# Patient Record
Sex: FEMALE | Race: WHITE | NOT HISPANIC OR LATINO | Employment: FULL TIME | URBAN - METROPOLITAN AREA
[De-identification: names, ages, dates, MRNs, and addresses within clinical notes are randomized per-mention and may not be internally consistent; named-entity substitution may affect disease eponyms.]

---

## 2017-01-16 ENCOUNTER — APPOINTMENT (EMERGENCY)
Dept: RADIOLOGY | Facility: HOSPITAL | Age: 52
End: 2017-01-16
Payer: COMMERCIAL

## 2017-01-16 ENCOUNTER — HOSPITAL ENCOUNTER (OUTPATIENT)
Facility: HOSPITAL | Age: 52
Setting detail: OBSERVATION
Discharge: HOME | End: 2017-01-17
Attending: EMERGENCY MEDICINE | Admitting: FAMILY MEDICINE
Payer: COMMERCIAL

## 2017-01-16 DIAGNOSIS — R07.9 CHEST PAIN, UNSPECIFIED TYPE: Primary | ICD-10-CM

## 2017-01-16 PROBLEM — K76.0 NONALCOHOLIC FATTY LIVER DISEASE: Status: ACTIVE | Noted: 2017-01-16

## 2017-01-16 PROBLEM — R73.03 PREDIABETES: Status: ACTIVE | Noted: 2017-01-16

## 2017-01-16 PROBLEM — K62.5 BRBPR (BRIGHT RED BLOOD PER RECTUM): Status: ACTIVE | Noted: 2017-01-16

## 2017-01-16 PROBLEM — J44.9 COPD (CHRONIC OBSTRUCTIVE PULMONARY DISEASE) (HCC): Status: ACTIVE | Noted: 2017-01-16

## 2017-01-16 LAB
ALBUMIN SERPL BCP-MCNC: 3.6 G/DL (ref 3.5–5)
ALP SERPL-CCNC: 88 U/L (ref 46–116)
ALT SERPL W P-5'-P-CCNC: 30 U/L (ref 12–78)
ANION GAP SERPL CALCULATED.3IONS-SCNC: 10 MMOL/L (ref 4–13)
APTT PPP: 29 SECONDS (ref 24–33)
AST SERPL W P-5'-P-CCNC: 14 U/L (ref 5–45)
BASOPHILS # BLD AUTO: 0.1 THOUSANDS/ΜL (ref 0–0.1)
BASOPHILS NFR BLD AUTO: 1 % (ref 0–1)
BILIRUB SERPL-MCNC: 0.2 MG/DL (ref 0.2–1)
BUN SERPL-MCNC: 15 MG/DL (ref 5–25)
CALCIUM SERPL-MCNC: 8.9 MG/DL (ref 8.3–10.1)
CHLORIDE SERPL-SCNC: 106 MMOL/L (ref 100–108)
CO2 SERPL-SCNC: 27 MMOL/L (ref 21–32)
CREAT SERPL-MCNC: 0.86 MG/DL (ref 0.6–1.3)
EOSINOPHIL # BLD AUTO: 0.1 THOUSAND/ΜL (ref 0–0.61)
EOSINOPHIL NFR BLD AUTO: 2 % (ref 0–6)
ERYTHROCYTE [DISTWIDTH] IN BLOOD BY AUTOMATED COUNT: 13.9 % (ref 11.6–15.1)
GFR SERPL CREATININE-BSD FRML MDRD: >60 ML/MIN/1.73SQ M
GLUCOSE SERPL-MCNC: 99 MG/DL (ref 65–140)
HCT VFR BLD AUTO: 39.2 % (ref 37–47)
HGB BLD-MCNC: 13 G/DL (ref 12–16)
INR PPP: 0.95 (ref 0.86–1.16)
LYMPHOCYTES # BLD AUTO: 2.9 THOUSANDS/ΜL (ref 0.6–4.47)
LYMPHOCYTES NFR BLD AUTO: 37 % (ref 14–44)
MCH RBC QN AUTO: 30.2 PG (ref 27–31)
MCHC RBC AUTO-ENTMCNC: 33.2 G/DL (ref 31.4–37.4)
MCV RBC AUTO: 91 FL (ref 82–98)
MONOCYTES # BLD AUTO: 0.5 THOUSAND/ΜL (ref 0.17–1.22)
MONOCYTES NFR BLD AUTO: 6 % (ref 4–12)
NEUTROPHILS # BLD AUTO: 4.3 THOUSANDS/ΜL (ref 1.85–7.62)
NEUTS SEG NFR BLD AUTO: 54 % (ref 43–75)
NRBC BLD AUTO-RTO: 0 /100 WBCS
PLATELET # BLD AUTO: 305 THOUSANDS/UL (ref 130–400)
PMV BLD AUTO: 7.9 FL (ref 8.9–12.7)
POTASSIUM SERPL-SCNC: 3.7 MMOL/L (ref 3.5–5.3)
PROT SERPL-MCNC: 7.3 G/DL (ref 6.4–8.2)
PROTHROMBIN TIME: 10 SECONDS (ref 9.4–11.7)
RBC # BLD AUTO: 4.31 MILLION/UL (ref 4.2–5.4)
SODIUM SERPL-SCNC: 143 MMOL/L (ref 136–145)
TROPONIN I SERPL-MCNC: <0.02 NG/ML
WBC # BLD AUTO: 7.9 THOUSAND/UL (ref 4.8–10.8)

## 2017-01-16 PROCEDURE — 85025 COMPLETE CBC W/AUTO DIFF WBC: CPT | Performed by: EMERGENCY MEDICINE

## 2017-01-16 PROCEDURE — 96374 THER/PROPH/DIAG INJ IV PUSH: CPT

## 2017-01-16 PROCEDURE — 36415 COLL VENOUS BLD VENIPUNCTURE: CPT | Performed by: EMERGENCY MEDICINE

## 2017-01-16 PROCEDURE — 84484 ASSAY OF TROPONIN QUANT: CPT | Performed by: EMERGENCY MEDICINE

## 2017-01-16 PROCEDURE — 80061 LIPID PANEL: CPT | Performed by: FAMILY MEDICINE

## 2017-01-16 PROCEDURE — 99285 EMERGENCY DEPT VISIT HI MDM: CPT

## 2017-01-16 PROCEDURE — 80053 COMPREHEN METABOLIC PANEL: CPT | Performed by: EMERGENCY MEDICINE

## 2017-01-16 PROCEDURE — 85610 PROTHROMBIN TIME: CPT | Performed by: EMERGENCY MEDICINE

## 2017-01-16 PROCEDURE — 93005 ELECTROCARDIOGRAM TRACING: CPT | Performed by: EMERGENCY MEDICINE

## 2017-01-16 PROCEDURE — 71010 HB CHEST X-RAY 1 VIEW FRONTAL (PORTABLE): CPT

## 2017-01-16 PROCEDURE — 85730 THROMBOPLASTIN TIME PARTIAL: CPT | Performed by: EMERGENCY MEDICINE

## 2017-01-16 RX ORDER — NITROGLYCERIN 0.4 MG/1
0.4 TABLET SUBLINGUAL ONCE
Status: COMPLETED | OUTPATIENT
Start: 2017-01-16 | End: 2017-01-16

## 2017-01-16 RX ORDER — MORPHINE SULFATE 4 MG/ML
4 INJECTION, SOLUTION INTRAMUSCULAR; INTRAVENOUS ONCE
Status: COMPLETED | OUTPATIENT
Start: 2017-01-16 | End: 2017-01-16

## 2017-01-16 RX ORDER — ASPIRIN 81 MG/1
81 TABLET, CHEWABLE ORAL ONCE
Status: COMPLETED | OUTPATIENT
Start: 2017-01-16 | End: 2017-01-16

## 2017-01-16 RX ADMIN — MORPHINE SULFATE 4 MG: 4 INJECTION, SOLUTION INTRAMUSCULAR; INTRAVENOUS at 20:02

## 2017-01-16 RX ADMIN — ASPIRIN 81 MG 81 MG: 81 TABLET ORAL at 19:29

## 2017-01-16 RX ADMIN — NITROGLYCERIN 0.4 MG: 0.4 TABLET SUBLINGUAL at 19:31

## 2017-01-17 ENCOUNTER — APPOINTMENT (OUTPATIENT)
Dept: RADIOLOGY | Facility: HOSPITAL | Age: 52
End: 2017-01-17
Payer: COMMERCIAL

## 2017-01-17 ENCOUNTER — APPOINTMENT (OUTPATIENT)
Dept: NON INVASIVE DIAGNOSTICS | Facility: HOSPITAL | Age: 52
End: 2017-01-17
Payer: COMMERCIAL

## 2017-01-17 VITALS
SYSTOLIC BLOOD PRESSURE: 122 MMHG | HEART RATE: 82 BPM | BODY MASS INDEX: 31.15 KG/M2 | OXYGEN SATURATION: 98 % | DIASTOLIC BLOOD PRESSURE: 60 MMHG | HEIGHT: 61 IN | WEIGHT: 165 LBS | RESPIRATION RATE: 18 BRPM | TEMPERATURE: 97.9 F

## 2017-01-17 LAB
ALBUMIN SERPL BCP-MCNC: 3.1 G/DL (ref 3.5–5)
ALP SERPL-CCNC: 75 U/L (ref 46–116)
ALT SERPL W P-5'-P-CCNC: 25 U/L (ref 12–78)
ANION GAP SERPL CALCULATED.3IONS-SCNC: 10 MMOL/L (ref 4–13)
AST SERPL W P-5'-P-CCNC: 15 U/L (ref 5–45)
BILIRUB SERPL-MCNC: 0.2 MG/DL (ref 0.2–1)
BUN SERPL-MCNC: 12 MG/DL (ref 5–25)
CALCIUM SERPL-MCNC: 8.6 MG/DL (ref 8.3–10.1)
CHLORIDE SERPL-SCNC: 108 MMOL/L (ref 100–108)
CHOLEST SERPL-MCNC: 166 MG/DL (ref 50–200)
CO2 SERPL-SCNC: 26 MMOL/L (ref 21–32)
CREAT SERPL-MCNC: 0.63 MG/DL (ref 0.6–1.3)
ERYTHROCYTE [DISTWIDTH] IN BLOOD BY AUTOMATED COUNT: 14 % (ref 11.6–15.1)
EST. AVERAGE GLUCOSE BLD GHB EST-MCNC: 117 MG/DL
FLUAV AG SPEC QL IA: NEGATIVE
FLUAV AG SPEC QL: NORMAL
FLUBV AG SPEC QL IA: NEGATIVE
FLUBV AG SPEC QL: NORMAL
GFR SERPL CREATININE-BSD FRML MDRD: >60 ML/MIN/1.73SQ M
GLUCOSE SERPL-MCNC: 103 MG/DL (ref 65–140)
HBA1C MFR BLD: 5.7 % (ref 4.2–6.3)
HCT VFR BLD AUTO: 37 % (ref 37–47)
HDLC SERPL-MCNC: 64 MG/DL (ref 40–60)
HGB BLD-MCNC: 12.1 G/DL (ref 12–16)
LDLC SERPL CALC-MCNC: 89 MG/DL (ref 0–100)
MAGNESIUM SERPL-MCNC: 2 MG/DL (ref 1.6–2.6)
MCH RBC QN AUTO: 29.9 PG (ref 27–31)
MCHC RBC AUTO-ENTMCNC: 32.7 G/DL (ref 31.4–37.4)
MCV RBC AUTO: 92 FL (ref 82–98)
PHOSPHATE SERPL-MCNC: 3.9 MG/DL (ref 2.7–4.5)
PLATELET # BLD AUTO: 262 THOUSANDS/UL (ref 130–400)
PLATELET # BLD AUTO: 294 THOUSANDS/UL (ref 130–400)
PMV BLD AUTO: 7.5 FL (ref 8.9–12.7)
PMV BLD AUTO: 8 FL (ref 8.9–12.7)
POTASSIUM SERPL-SCNC: 3.4 MMOL/L (ref 3.5–5.3)
PROT SERPL-MCNC: 6.5 G/DL (ref 6.4–8.2)
RBC # BLD AUTO: 4.04 MILLION/UL (ref 4.2–5.4)
RSV B RNA SPEC QL NAA+PROBE: NORMAL
SODIUM SERPL-SCNC: 144 MMOL/L (ref 136–145)
TRIGL SERPL-MCNC: 67 MG/DL
TROPONIN I SERPL-MCNC: <0.02 NG/ML
TROPONIN I SERPL-MCNC: <0.02 NG/ML
WBC # BLD AUTO: 7.8 THOUSAND/UL (ref 4.8–10.8)

## 2017-01-17 PROCEDURE — 84100 ASSAY OF PHOSPHORUS: CPT | Performed by: FAMILY MEDICINE

## 2017-01-17 PROCEDURE — 84484 ASSAY OF TROPONIN QUANT: CPT | Performed by: FAMILY MEDICINE

## 2017-01-17 PROCEDURE — 87400 INFLUENZA A/B EACH AG IA: CPT | Performed by: INTERNAL MEDICINE

## 2017-01-17 PROCEDURE — A9502 TC99M TETROFOSMIN: HCPCS

## 2017-01-17 PROCEDURE — 80053 COMPREHEN METABOLIC PANEL: CPT | Performed by: FAMILY MEDICINE

## 2017-01-17 PROCEDURE — 87798 DETECT AGENT NOS DNA AMP: CPT | Performed by: INTERNAL MEDICINE

## 2017-01-17 PROCEDURE — 93005 ELECTROCARDIOGRAM TRACING: CPT | Performed by: FAMILY MEDICINE

## 2017-01-17 PROCEDURE — 85027 COMPLETE CBC AUTOMATED: CPT | Performed by: FAMILY MEDICINE

## 2017-01-17 PROCEDURE — 93017 CV STRESS TEST TRACING ONLY: CPT

## 2017-01-17 PROCEDURE — 85049 AUTOMATED PLATELET COUNT: CPT | Performed by: FAMILY MEDICINE

## 2017-01-17 PROCEDURE — 87081 CULTURE SCREEN ONLY: CPT | Performed by: FAMILY MEDICINE

## 2017-01-17 PROCEDURE — 83735 ASSAY OF MAGNESIUM: CPT | Performed by: FAMILY MEDICINE

## 2017-01-17 PROCEDURE — 78452 HT MUSCLE IMAGE SPECT MULT: CPT

## 2017-01-17 PROCEDURE — 94760 N-INVAS EAR/PLS OXIMETRY 1: CPT

## 2017-01-17 PROCEDURE — 83036 HEMOGLOBIN GLYCOSYLATED A1C: CPT | Performed by: FAMILY MEDICINE

## 2017-01-17 RX ORDER — METOPROLOL SUCCINATE 25 MG/1
25 TABLET, EXTENDED RELEASE ORAL DAILY
Qty: 30 TABLET | Refills: 0
Start: 2017-01-17 | End: 2018-03-09 | Stop reason: SDUPTHER

## 2017-01-17 RX ORDER — NITROGLYCERIN 0.4 MG/1
0.4 TABLET SUBLINGUAL
Status: DISCONTINUED | OUTPATIENT
Start: 2017-01-17 | End: 2017-01-17 | Stop reason: HOSPADM

## 2017-01-17 RX ORDER — ATORVASTATIN CALCIUM 40 MG/1
40 TABLET, FILM COATED ORAL DAILY
Qty: 30 TABLET | Refills: 0
Start: 2017-01-17 | End: 2018-03-09 | Stop reason: SDUPTHER

## 2017-01-17 RX ORDER — ACETAMINOPHEN 325 MG/1
650 TABLET ORAL EVERY 6 HOURS PRN
Status: DISCONTINUED | OUTPATIENT
Start: 2017-01-17 | End: 2017-01-17 | Stop reason: HOSPADM

## 2017-01-17 RX ORDER — MORPHINE SULFATE 2 MG/ML
1 INJECTION, SOLUTION INTRAMUSCULAR; INTRAVENOUS EVERY 4 HOURS PRN
Status: DISCONTINUED | OUTPATIENT
Start: 2017-01-17 | End: 2017-01-17 | Stop reason: HOSPADM

## 2017-01-17 RX ORDER — SODIUM CHLORIDE 9 MG/ML
50 INJECTION, SOLUTION INTRAVENOUS CONTINUOUS
Status: DISCONTINUED | OUTPATIENT
Start: 2017-01-17 | End: 2017-01-17 | Stop reason: HOSPADM

## 2017-01-17 RX ORDER — ALBUTEROL SULFATE 2.5 MG/3ML
2.5 SOLUTION RESPIRATORY (INHALATION) EVERY 6 HOURS PRN
Status: DISCONTINUED | OUTPATIENT
Start: 2017-01-17 | End: 2017-01-17 | Stop reason: HOSPADM

## 2017-01-17 RX ORDER — POTASSIUM CHLORIDE 20MEQ/15ML
40 LIQUID (ML) ORAL ONCE
Status: COMPLETED | OUTPATIENT
Start: 2017-01-17 | End: 2017-01-17

## 2017-01-17 RX ORDER — ASPIRIN 325 MG
325 TABLET ORAL DAILY
Status: DISCONTINUED | OUTPATIENT
Start: 2017-01-17 | End: 2017-01-17 | Stop reason: HOSPADM

## 2017-01-17 RX ORDER — HEPARIN SODIUM 5000 [USP'U]/ML
5000 INJECTION, SOLUTION INTRAVENOUS; SUBCUTANEOUS EVERY 8 HOURS SCHEDULED
Status: DISCONTINUED | OUTPATIENT
Start: 2017-01-17 | End: 2017-01-17 | Stop reason: HOSPADM

## 2017-01-17 RX ORDER — ONDANSETRON 2 MG/ML
4 INJECTION INTRAMUSCULAR; INTRAVENOUS EVERY 6 HOURS PRN
Status: DISCONTINUED | OUTPATIENT
Start: 2017-01-17 | End: 2017-01-17 | Stop reason: HOSPADM

## 2017-01-17 RX ADMIN — SODIUM CHLORIDE 50 ML/HR: 0.9 INJECTION, SOLUTION INTRAVENOUS at 00:25

## 2017-01-17 RX ADMIN — HEPARIN SODIUM 5000 UNITS: 5000 INJECTION, SOLUTION INTRAVENOUS; SUBCUTANEOUS at 14:11

## 2017-01-17 RX ADMIN — HEPARIN SODIUM 5000 UNITS: 5000 INJECTION, SOLUTION INTRAVENOUS; SUBCUTANEOUS at 05:54

## 2017-01-17 RX ADMIN — REGADENOSON 0.4 MG: 0.08 INJECTION, SOLUTION INTRAVENOUS at 11:26

## 2017-01-17 RX ADMIN — POTASSIUM CHLORIDE 40 MEQ: 20 SOLUTION ORAL at 09:00

## 2017-01-17 RX ADMIN — MORPHINE SULFATE 1 MG: 2 INJECTION, SOLUTION INTRAMUSCULAR; INTRAVENOUS at 01:21

## 2017-01-17 RX ADMIN — ASPIRIN 325 MG: 325 TABLET ORAL at 08:59

## 2017-01-17 RX ADMIN — ACETAMINOPHEN 650 MG: 325 TABLET, FILM COATED ORAL at 11:52

## 2017-01-18 LAB — MRSA NOSE QL CULT: NORMAL

## 2017-01-22 LAB
ATRIAL RATE: 64 BPM
ATRIAL RATE: 80 BPM
P AXIS: 66 DEGREES
P AXIS: 69 DEGREES
PR INTERVAL: 148 MS
PR INTERVAL: 150 MS
QRS AXIS: 21 DEGREES
QRS AXIS: 24 DEGREES
QRSD INTERVAL: 82 MS
QRSD INTERVAL: 92 MS
QT INTERVAL: 342 MS
QT INTERVAL: 398 MS
QTC INTERVAL: 394 MS
QTC INTERVAL: 410 MS
T WAVE AXIS: 40 DEGREES
T WAVE AXIS: 42 DEGREES
VENTRICULAR RATE: 64 BPM
VENTRICULAR RATE: 80 BPM

## 2017-01-24 ENCOUNTER — GENERIC CONVERSION - ENCOUNTER (OUTPATIENT)
Dept: OTHER | Facility: OTHER | Age: 52
End: 2017-01-24

## 2017-01-24 ENCOUNTER — ALLSCRIPTS OFFICE VISIT (OUTPATIENT)
Dept: OTHER | Facility: OTHER | Age: 52
End: 2017-01-24

## 2017-01-24 DIAGNOSIS — Z12.39 ENCOUNTER FOR OTHER SCREENING FOR MALIGNANT NEOPLASM OF BREAST: ICD-10-CM

## 2017-12-20 ENCOUNTER — ALLSCRIPTS OFFICE VISIT (OUTPATIENT)
Dept: OTHER | Facility: OTHER | Age: 52
End: 2017-12-20

## 2017-12-23 NOTE — PROGRESS NOTES
Assessment   1  Internal hemorrhoids (455 0) (K64 8)    Plan   Need for influenza vaccination    · Fluzone Quadrivalent 0 5 ML Intramuscular Suspension Prefilled Syringe  Screening for depression    · *VB-Depression Screening; Status:Complete;   Done: 48TGQ9737 09:38AM    Discussion/Summary      44 yo female at office with internal hemorrhoids reassurance  pt denies recurrent blood in stool and denies abdominal pain advised drinking plenty of water daily  advised eating high fiber diet  up as needed  vaccine given today  The patient was counseled regarding instructions for management,-- risk factor reductions,-- patient and family education,-- impressions,-- importance of compliance with treatment  The treatment plan was reviewed with the patient/guardian  The patient/guardian understands and agrees with the treatment plan      Chief Complaint   pain in the ABD and bloody dark stools yesterday wants flu vaccine and PCV 13      History of Present Illness   HPI: 44 yo female at office for blood in stool  She notes yesterday she had a loose BM with blood in the toilet bowel and blood when she wiped  She notes she did have lower abdominal pain prior to bowel movement which resolved after BM  She notes two days prior she had been straining with her daily bowel movements  She denies recurrent bloody BMs   denies chest pain, SOB, lightheadedness, palpitations, nausea, vomiting, rash  She denies recent travel and antibiotic use  Pt does note she takes a lot of advil - she takes 6 advil a day for the past year for headaches  Review of Systems        Constitutional: no fever-- and-- no chills  Cardiovascular: no chest pain  Gastrointestinal: abdominal pain,-- constipation-- and-- bloody stools, but-- no nausea,-- no vomiting-- and-- no diarrhea  Integumentary: no rashes  Neurological: headache, but-- no dizziness  ROS reviewed  Active Problems   1  Abdominal pain (789 00) (R10 9)   2  Atypical chest pain (786 59) (R07 89)   3  Change in stool (792 1) (R19 5)   4  Chronic diarrhea (787 91) (K52 9)   5  Chronic obstructive pulmonary disease (496) (J44 9)   6  Coronary artery disease involving native coronary artery of native heart without angina     pectoris (414 01) (I25 10)   7  Diastolic heart failure, NYHA class 1 (428 30) (I50 30)   8  Diverticulosis (562 10) (K57 90)   9  Encounter for other screening for malignant neoplasm of breast (V76 19) (Z12 39)   10  Carrolyn Heft blood in stool (578 1) (K92 1)   11  Hospital discharge follow-up (V67 59) (Z09)   12  Need for influenza vaccination (V04 81) (Z23)   13  Nonalcoholic fatty liver disease (571 8) (K76 0)   14  Prediabetes (790 29) (R73 09)   15  Stool incontinence (787 60) (R15 9)    Past Medical History   1  History of Abscess of left thigh (682 6) (L02 416)   2  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)  Active Problems And Past Medical History Reviewed: The active problems and past medical history were reviewed and updated today  Family History   Mother    1  Denied: Family history of Cancer, colon   2  Denied: Family history of Crohn's disease without complication, unspecified     gastrointestinal tract location   3  Family history of cardiac disorder (V17 49) (Z82 49)   4  Denied: Family history of liver cancer  Father    5  Denied: Family history of Cancer, colon   6  Denied: Family history of Crohn's disease without complication, unspecified     gastrointestinal tract location   7  Denied: Family history of liver cancer  Aunt    8  Family history of malignant neoplasm of cervix uteri (V16 49) (Z80 49)  Family History    9  Family history of diabetes mellitus (V18 0) (Z83 3)  Family History Reviewed: The family history was reviewed and updated today  Social History    · Ex-smoker (F45 60) (Z87 891)   · Former smoker   · Non drinker / no alcohol use  The social history was reviewed and updated today        Surgical History   1  History of Appendectomy   2  History of Tonsillectomy  Surgical History Reviewed: The surgical history was reviewed and updated today  Current Meds    1  Aspirin 81 MG Oral Tablet Delayed Release; Take 4 tablets daily; Therapy: 73BWH9475 to (Last Rx:76Ayv8849) Ordered   2  Atorvastatin Calcium 40 MG Oral Tablet; TAKE 1 TABLET DAILY  Requested for:     87Qry9535; Last Rx:58Eks3040 Ordered   3  Metoprolol Succinate ER 25 MG Oral Tablet Extended Release 24 Hour; TAKE 1 TABLET     DAILY  Requested for: 34Flk7728; Last Rx:15Igp6464 Ordered   4  ProAir  (90 Base) MCG/ACT Inhalation Aerosol Solution; INHALE 1 PUFF     EVERY 4 HOURS AS NEEDED; Therapy: 83XSS3782 to (Last Rx:16Cdx9129)  Requested for: 64Zgg1772 Ordered     The medication list was reviewed and updated today  Allergies   1  Bactrim DS TABS   2  Codeine Derivatives  3  No Known Latex Allergies    Vitals    Recorded: 20Dec2017 09:33AM   Heart Rate 98   Respiration 20   Systolic 708   Diastolic 70   Height 5 ft 2 5 in   Weight 170 lb    BMI Calculated 30 6   BSA Calculated 1 79   O2 Saturation 99     Physical Exam        Constitutional      General appearance: No acute distress, well appearing and well nourished  Pulmonary      Respiratory effort: No increased work of breathing or signs of respiratory distress  Auscultation of lungs: Clear to auscultation  Cardiovascular      Auscultation of heart: Normal rate and rhythm, normal S1 and S2, without murmurs  Abdomen      Abdomen: Non-tender, no masses            Results/Data   *VB-Depression Screening 20Dec2017 09:38AM Mitch Spencer      Test Name Result Flag Reference   Depression Scale Result      Depression Screen - Negative For Symptoms      PHQ-2 Adult Depression Screening 20Dec2017 09:36AM User, s      Test Name Result Flag Reference   PHQ-2 Adult Depression Score 0     Over the last two weeks, how often have you been bothered by any of the following problems? Little interest or pleasure in doing things: Not at all - 0     Feeling down, depressed, or hopeless: Not at all - 0   PHQ-2 Adult Depression Screening Negative          Attending Note   Attending Note [address]: Attending Note: I agree with the Resident management plan as it was presented to me  I agree with the Resident's note        Signatures    Electronically signed by : Steve Gillespie MD; Dec 21 2017  2:34PM EST                       (Author)     Electronically signed by : SILVIA Vences ; Dec 22 2017  8:35AM EST                       (Author)

## 2018-01-11 NOTE — RESULT NOTES
Message  Called home number  Left voicemail  Relayed normal blood work with the exception of slightly elevated A1C        Signatures   Electronically signed by : Cherylynn Dubin, M D ; Jul 26 2016  5:30PM EST                       (Author)

## 2018-01-12 NOTE — RESULT NOTES
Message    Ultrasound is normal   The random colon biopsies are benign and negative for colitis  Patient was referred to Dr Tanya Pantoja  Make sure that she made an appointment        pt aware of results/pt is scheduled for September 7th to see Dr Tanya Pantoja  thanks CF         Verified Results  US ABDOMEN LIMITED [id number] 08:56AM Ihsan Batista     Test Name Result Flag Reference   US ABDOMEN LIMITED (Report)     RIGHT UPPER QUADRANT ULTRASOUND     INDICATION: Right upper quadrant pain  COMPARISON: 2/10/2014     TECHNIQUE:  Real-time ultrasound of the right upper quadrant was performed with a curvilinear transducer with both volumetric sweeps and still imaging techniques  FINDINGS:     PANCREAS: Visualized portions of the pancreas are within normal limits  AORTA AND IVC: Visualized portions are normal for patient age  LIVER:   Size: Within normal range  The liver measures 15 8 cm in the midclavicular line  Contour: Surface contour is smooth  Parenchyma: Fatty change in the liver with some sparing in the region of the mara hepatis  No evidence of suspicious mass  Cyst in the left lobe measuring 1 4 cm in diameter  The main portal vein is patent and hepatopetal       BILIARY:   The gallbladder is normal in caliber  The gallbladder wall is thickened measuring 5 mm  The gallbladder is contracted  No stones or sludge identified  No sonographic Irby's sign  No intrahepatic biliary dilatation  CBD measures 4 mm  No choledocholithiasis  KIDNEY:    Right kidney measures 10 7 x 3 7 cm  The renal cortex measures 1 1 cm  Within normal limits  ASCITES:  None  IMPRESSION:       1  Fatty liver  2  No gallstones  3  The gallbladder is contracted with mild wall thickening  The ultrasound Irby sign is negative         Workstation performed: [id number]     Signed by:   Hector Walsh MD   8/5/16     (1) TISSUE EXAM [id number] 12:15PM Ihsan Batista     Test Name Result [address] [facility] Reference   LAB AP CASE REPORT (Report)     Surgical Pathology Report             Case: [id number]                   Authorizing Provider: Xavier Merrill MD     Collected:      08/03/2016 1215        Ordering Location:   [facility]    Received:      08/03/2016 [place] [address] Endoscopy                               Pathologist:      Mickey Mccauley MD                                 Specimen:  Colon, Bx: Random colon R/O microscopic colitis   LAB AP FINAL DIAGNOSIS (Report)     A  Colon (random biopsy):    - Colonic mucosa with no significant pathologic abnormalities  - No active inflammation or histologic evidence of a microscopic   (lymphocytic)     or collagenous colitis noted  - No granulomas, dysplasia or neoplasia identified  Electronically signed by Mickey Mccauley MD on 8/5/2016 at 12:56 PM   LAB AP NOTE      Interpretation performed at [address] [place] [address]  LAB AP SURGICAL ADDITIONAL INFORMATION (Report)     These tests were developed and their performance characteristics   determined by [facility] or [facility]  They may not be cleared or approved by the [place]  Food and   Drug Administration  The FDA has determined that such clearance or   approval is not necessary  These tests are used for clinical purposes  They should not be regarded as investigational or for research  This   laboratory has been approved by CLIA 88, designated as a high-complexity   laboratory and is qualified to perform these tests  LAB AP GROSS DESCRIPTION (Report)     A  The specimen is received in formalin, labeled with the patient's name   and hospital number, and is designated random colon biopsy, rule out   microscopic colitis  The specimen consists of multiple white to tan soft   tissue fragments measuring in aggregate 1 0 x 0 9 x 0 2 cm in greatest   dimension  Entirely submitted  One cassette      Note: The estimated total formalin fixation time based upon information   provided by the submitting clinician and the standard processing schedule   is 25 75 hours  ACL   LAB AP CLINICAL INFORMATION      Full incontinence of feces  ??? Noninfective gastroenteritis and colitis

## 2018-01-22 VITALS
RESPIRATION RATE: 20 BRPM | SYSTOLIC BLOOD PRESSURE: 118 MMHG | HEIGHT: 63 IN | HEART RATE: 98 BPM | WEIGHT: 170 LBS | DIASTOLIC BLOOD PRESSURE: 70 MMHG | BODY MASS INDEX: 30.12 KG/M2 | OXYGEN SATURATION: 99 %

## 2018-01-22 VITALS
SYSTOLIC BLOOD PRESSURE: 122 MMHG | TEMPERATURE: 98.1 F | RESPIRATION RATE: 20 BRPM | OXYGEN SATURATION: 98 % | WEIGHT: 165.44 LBS | HEIGHT: 63 IN | DIASTOLIC BLOOD PRESSURE: 64 MMHG | BODY MASS INDEX: 29.31 KG/M2 | HEART RATE: 83 BPM

## 2018-01-23 NOTE — MISCELLANEOUS
Assessment   1  Need for influenza vaccination (V04 81) (Z23)1   2  Atypical chest pain (786 59) (R07 89)1   3  Hospital discharge follow-up (V67 59) (Z09)1   4  Encounter for other screening for malignant neoplasm of breast (V76 19) (Z12 39)1      1 Amended By: Bharati Kaplan; Jan 24 2017 2:47 PM EST    Discussion/Summary  Discussion Summary:   #Persistent chest pain usually with exertion and while at work; cardiac work up in hospital negative, may have component of anxiety, patient counseled to followup with cardiology for further evaluation, promises to do so  #Hx BRBPR- colonoscopy negative, refused evaluation by GI, promises to follow-up once cardiac issue has been resolved1        1 Amended By: Bharati Kaplan; Jan 24 2017 3:00 PM EST    Chief Complaint  Chief Complaint Free Text Note Form: 1/18/17 9:00 am  1st attempt to reach patient, no answer  Left a message on machine to call back to JUANITA Phillips RN  1/19/17 10:30 am, SPEEDY communication completed with patient  Discharged from [facility] 1/17/17 for chest pain  Follow up appointment is scheduled for 1/24/17 at 2:30 with Dr Ernesto Phillips RN      History of Present Illness  TCM Communication [facility]: The patient is being contacted for follow-up after hospitalization  She was hospitalized at [facility]  The date of admission: 1/16/17, date of discharge: 1/17/17  Diagnosis: chest pain  She was discharged to home  Medications reviewed and updated today  She scheduled a follow up appointment  Follow-up appointments with other specialists: Cardiology  Symptoms: fatigue  Referrals Needed:  none  Communication performed and completed by Arlene Hicks RN   HPI: 47 yo F admitted recently for chest pressure cardiac workup negative  Has not followed up with cardiology 1  outpatient 2  as she has been working  1  1,2  Also 2  1,2  reports hx of BRBPR had colonoscopy which was negative per patient no hx of hemorrhoids   Refusing referral to GI as would like to address cardiac issues first  Promises to make appt with cardiologist tomorrow  2        1 Amended By: Michael Vincent; Jan 24 2017 2:41 PM EST   2 Amended By: Michael Vincent; Jan 24 2017 2:58 PM EST    Review of Systems  Complete-Female:   Constitutional:1  no fever1  and no chills1   Cardiovascular:1  chest pain1 , but as noted in HPI1 , the heart rate was not slow1 , no intermittent leg claudication1 , the heart rate was not fast1 , no palpitations1  and no lower extremity edema1   Neurological:1  no confusion1 , no dizziness1 , no limb weakness1 , no fainting1  and no difficulty walking1   Psychiatric:1  no anxiety1   ROS Reviewed:   ROS reviewed  1        1 Amended By: Michael Vincent; Jan 24 2017 2:58 PM EST    Active Problems   1  Abdominal pain (789 00) (R10 9)  2  Change in stool (792 1) (R19 5)  3  Chronic diarrhea (787 91) (K52 9)  4  Chronic obstructive pulmonary disease (496) (J44 9)  5  Coronary artery disease involving native coronary artery of native heart without angina   pectoris (414 01) (I25 10)  6  Diastolic heart failure, NYHA class 1 (428 30) (I50 30)  7  Diverticulosis (562 10) (K57 90)  8  Aldon Keen blood in stool (578 1) (K92 1)  9  Nonalcoholic fatty liver disease (571 8) (K76 0)  10  Prediabetes (790 29) (R73 09)  11  Stool incontinence (787 60) (R15 9)    Past Medical History   1  History of Abscess of left thigh (682 6) (L02 416)  2  History of Atypical chest pain (786 59) (R07 89)  3  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)    Surgical History   1  History of Appendectomy  2  History of Tonsillectomy    Family History  Mother   1  Denied: Family history of Cancer, colon  2  Denied: Family history of Crohn's disease without complication, unspecified   gastrointestinal tract location  3  Family history of cardiac disorder (V17 49) (Z82 49)  4  Denied: Family history of liver cancer  Father   5  Denied: Family history of Cancer, colon  6   Denied: Family history of Crohn's disease without complication, unspecified   gastrointestinal tract location  7  Denied: Family history of liver cancer  Aunt   8  Family history of malignant neoplasm of cervix uteri (V16 49) (Z80 49)  Family History   9  Family history of diabetes mellitus (V18 0) (Z83 3)    Social History    · Ex-smoker (W91 12) (Z87 891)   · Former smoker   · Non drinker / no alcohol use    Current Meds  1  Aspirin 81 MG Oral Tablet Delayed Release; Take 4 tablets daily; Therapy: 07NSL2450 to (Last Rx:06Jul2016) Ordered  2  Atorvastatin Calcium 40 MG Oral Tablet; TAKE 1 TABLET DAILY; Therapy: (Recorded:19Jan2017) to Recorded  3  ProAir  (90 Base) MCG/ACT Inhalation Aerosol Solution; INHALE 1 PUFF EVERY   4 HOURS AS NEEDED; Therapy: 05LXI2702 to (Last Rx:06Jul2016)  Requested for: 84FEN8796 Ordered  4  Toprol XL 25 MG Oral Tablet Extended Release 24 Hour; TAKE 1 TABLET DAILY; Therapy: (Recorded:19Jan2017) to Recorded    Allergies   1  Bactrim DS TABS  2  Codeine Derivatives   3  No Known Latex Allergies    Physical Exam    Constitutional1    General appearance: No acute distress, well appearing and well nourished  1    Eyes1    Conjunctiva and lids: No swelling, erythema or discharge  1    Pupils and irises: Equal, round and reactive to light  1    Ears, Nose, Mouth, and Throat1    External inspection of ears and nose: Normal 1    Pulmonary1    Respiratory effort: No increased work of breathing or signs of respiratory distress  1    Auscultation of lungs: Clear to auscultation  1    Cardiovascular1    Palpation of heart: Normal PMI, no thrills  1    Auscultation of heart: Normal rate and rhythm, normal S1 and S2, without murmurs  1    Examination of extremities for edema and/or varicosities: Normal 1    Abdomen1    Abdomen: Non-tender, no masses  1    Liver and spleen: No hepatomegaly or splenomegaly  1    Lymphatic1    Palpation of lymph nodes in neck: No lymphadenopathy  1    Musculoskeletal1    Gait and station: Normal 1    Digits and nails: Normal without clubbing or cyanosis  1    Inspection/palpation of joints, bones, and muscles: Normal 1    Skin1    Skin and subcutaneous tissue: Normal without rashes or lesions  1    Neurologic1    Cranial nerves: Cranial nerves 2-12 intact  1    Reflexes: 2+ and symmetric  1    Sensation: No sensory loss  1    Psychiatric1    Orientation to person, place, and time: Normal 1    Mood and affect: Normal 1          1 Amended By: Bee Rodriguez; Jan 24 2017 2:59 PM EST    Message   Recorded as Task   Date: 01/17/2017 04:32 PM, Created By: Pranay Holt   Task Name: Miscellaneous   Assigned To: Lindsey Carlson   Regarding Patient: Arian Mercado, Status: In Progress   Comment:    Pranay Holt - 17 Jan 2017 4:32 PM     TASK CREATED  Caller: Self; Other; [phone] (Home)  [place] [address] 1/24 DR Anuel Courtney  2:30 PM   Yahaira Nguyễn - 18 Jan 2017 8:42 AM     TASK EDITED   Jorge Mart - 18 Jan 2017 8:55 AM     TASK IN PROGRESS     Future Appointments    Date/Time Provider Specialty Site   01/24/2017 02:30 PM GLORIA Weston  [address] [place] [address]   02/08/2017 10:30 AM GLORIA Beck   Family Medicine [facility]     Signatures   Electronically signed by : GLORIA Pacheco ; Jan 20 2017 11:47AM EST                       (Author)    Electronically signed by : GLORIA Clinton ; Jan 22 2017 12:10PM EST                       (Co-author)    Electronically signed by : GLORIA Pacheco ; Jan 24 2017  3:04PM EST                       (Author)    Electronically signed by : GLORIA Blue ; Jan 24 2017  3:52PM EST

## 2018-03-09 DIAGNOSIS — I10 ESSENTIAL HYPERTENSION: Primary | ICD-10-CM

## 2018-03-09 DIAGNOSIS — E78.5 HYPERLIPIDEMIA, UNSPECIFIED HYPERLIPIDEMIA TYPE: ICD-10-CM

## 2018-03-09 RX ORDER — ATORVASTATIN CALCIUM 40 MG/1
40 TABLET, FILM COATED ORAL DAILY
Qty: 30 TABLET | Refills: 0
Start: 2018-03-09 | End: 2018-04-08

## 2018-03-09 RX ORDER — METOPROLOL SUCCINATE 25 MG/1
25 TABLET, EXTENDED RELEASE ORAL DAILY
Qty: 30 TABLET | Refills: 0
Start: 2018-03-09 | End: 2018-04-08

## 2022-07-27 ENCOUNTER — HOSPITAL ENCOUNTER (EMERGENCY)
Facility: HOSPITAL | Age: 57
Discharge: HOME | End: 2022-07-27
Attending: EMERGENCY MEDICINE | Admitting: EMERGENCY MEDICINE
Payer: COMMERCIAL

## 2022-07-27 ENCOUNTER — APPOINTMENT (EMERGENCY)
Dept: RADIOLOGY | Facility: HOSPITAL | Age: 57
End: 2022-07-27
Payer: COMMERCIAL

## 2022-07-27 VITALS
DIASTOLIC BLOOD PRESSURE: 77 MMHG | BODY MASS INDEX: 37.89 KG/M2 | OXYGEN SATURATION: 94 % | TEMPERATURE: 98.3 F | SYSTOLIC BLOOD PRESSURE: 151 MMHG | RESPIRATION RATE: 18 BRPM | WEIGHT: 210.54 LBS | HEART RATE: 89 BPM

## 2022-07-27 DIAGNOSIS — R06.00 DYSPNEA: Primary | ICD-10-CM

## 2022-07-27 DIAGNOSIS — R16.0 HEPATOMEGALY: ICD-10-CM

## 2022-07-27 DIAGNOSIS — E27.8 ADRENAL NODULE (HCC): ICD-10-CM

## 2022-07-27 DIAGNOSIS — Z87.09 HISTORY OF COPD: ICD-10-CM

## 2022-07-27 DIAGNOSIS — R91.8 PULMONARY NODULES: ICD-10-CM

## 2022-07-27 LAB
ALBUMIN SERPL BCP-MCNC: 3.4 G/DL (ref 3.5–5)
ALP SERPL-CCNC: 135 U/L (ref 46–116)
ALT SERPL W P-5'-P-CCNC: 61 U/L (ref 12–78)
ANION GAP SERPL CALCULATED.3IONS-SCNC: 12 MMOL/L (ref 4–13)
APTT PPP: 36 SECONDS (ref 23–37)
AST SERPL W P-5'-P-CCNC: 35 U/L (ref 5–45)
BASOPHILS # BLD AUTO: 0.09 THOUSANDS/ΜL (ref 0–0.1)
BASOPHILS NFR BLD AUTO: 1 % (ref 0–1)
BILIRUB SERPL-MCNC: 0.34 MG/DL (ref 0.2–1)
BUN SERPL-MCNC: 11 MG/DL (ref 5–25)
CALCIUM ALBUM COR SERPL-MCNC: 9.6 MG/DL (ref 8.3–10.1)
CALCIUM SERPL-MCNC: 9.1 MG/DL (ref 8.3–10.1)
CARDIAC TROPONIN I PNL SERPL HS: <2 NG/L
CARDIAC TROPONIN I PNL SERPL HS: <2 NG/L
CHLORIDE SERPL-SCNC: 102 MMOL/L (ref 96–108)
CO2 SERPL-SCNC: 24 MMOL/L (ref 21–32)
CREAT SERPL-MCNC: 0.77 MG/DL (ref 0.6–1.3)
D DIMER PPP FEU-MCNC: 0.66 UG/ML FEU
EOSINOPHIL # BLD AUTO: 0.18 THOUSAND/ΜL (ref 0–0.61)
EOSINOPHIL NFR BLD AUTO: 2 % (ref 0–6)
ERYTHROCYTE [DISTWIDTH] IN BLOOD BY AUTOMATED COUNT: 14.4 % (ref 11.6–15.1)
GFR SERPL CREATININE-BSD FRML MDRD: 84 ML/MIN/1.73SQ M
GLUCOSE SERPL-MCNC: 105 MG/DL (ref 65–140)
HCT VFR BLD AUTO: 43.3 % (ref 34.8–46.1)
HGB BLD-MCNC: 14 G/DL (ref 11.5–15.4)
IMM GRANULOCYTES # BLD AUTO: 0.04 THOUSAND/UL (ref 0–0.2)
IMM GRANULOCYTES NFR BLD AUTO: 0 % (ref 0–2)
INR PPP: 0.89 (ref 0.84–1.19)
LYMPHOCYTES # BLD AUTO: 3.69 THOUSANDS/ΜL (ref 0.6–4.47)
LYMPHOCYTES NFR BLD AUTO: 33 % (ref 14–44)
MCH RBC QN AUTO: 29.8 PG (ref 26.8–34.3)
MCHC RBC AUTO-ENTMCNC: 32.3 G/DL (ref 31.4–37.4)
MCV RBC AUTO: 92 FL (ref 82–98)
MONOCYTES # BLD AUTO: 0.82 THOUSAND/ΜL (ref 0.17–1.22)
MONOCYTES NFR BLD AUTO: 7 % (ref 4–12)
NEUTROPHILS # BLD AUTO: 6.32 THOUSANDS/ΜL (ref 1.85–7.62)
NEUTS SEG NFR BLD AUTO: 57 % (ref 43–75)
NRBC BLD AUTO-RTO: 0 /100 WBCS
NT-PROBNP SERPL-MCNC: 57 PG/ML
PLATELET # BLD AUTO: 328 THOUSANDS/UL (ref 149–390)
PMV BLD AUTO: 10.7 FL (ref 8.9–12.7)
POTASSIUM SERPL-SCNC: 4.5 MMOL/L (ref 3.5–5.3)
PROT SERPL-MCNC: 7.6 G/DL (ref 6.4–8.4)
PROTHROMBIN TIME: 12.2 SECONDS (ref 11.6–14.5)
RBC # BLD AUTO: 4.7 MILLION/UL (ref 3.81–5.12)
SODIUM SERPL-SCNC: 138 MMOL/L (ref 135–147)
WBC # BLD AUTO: 11.14 THOUSAND/UL (ref 4.31–10.16)

## 2022-07-27 PROCEDURE — G1004 CDSM NDSC: HCPCS

## 2022-07-27 PROCEDURE — 99285 EMERGENCY DEPT VISIT HI MDM: CPT

## 2022-07-27 PROCEDURE — 93005 ELECTROCARDIOGRAM TRACING: CPT

## 2022-07-27 PROCEDURE — 85025 COMPLETE CBC W/AUTO DIFF WBC: CPT

## 2022-07-27 PROCEDURE — 85730 THROMBOPLASTIN TIME PARTIAL: CPT | Performed by: EMERGENCY MEDICINE

## 2022-07-27 PROCEDURE — 85610 PROTHROMBIN TIME: CPT | Performed by: EMERGENCY MEDICINE

## 2022-07-27 PROCEDURE — 74177 CT ABD & PELVIS W/CONTRAST: CPT

## 2022-07-27 PROCEDURE — 71045 X-RAY EXAM CHEST 1 VIEW: CPT

## 2022-07-27 PROCEDURE — 99285 EMERGENCY DEPT VISIT HI MDM: CPT | Performed by: EMERGENCY MEDICINE

## 2022-07-27 PROCEDURE — 80053 COMPREHEN METABOLIC PANEL: CPT

## 2022-07-27 PROCEDURE — 85379 FIBRIN DEGRADATION QUANT: CPT | Performed by: EMERGENCY MEDICINE

## 2022-07-27 PROCEDURE — 71275 CT ANGIOGRAPHY CHEST: CPT

## 2022-07-27 PROCEDURE — 84484 ASSAY OF TROPONIN QUANT: CPT

## 2022-07-27 PROCEDURE — 83880 ASSAY OF NATRIURETIC PEPTIDE: CPT | Performed by: EMERGENCY MEDICINE

## 2022-07-27 PROCEDURE — 36415 COLL VENOUS BLD VENIPUNCTURE: CPT

## 2022-07-27 RX ORDER — ALBUTEROL SULFATE 90 UG/1
2 AEROSOL, METERED RESPIRATORY (INHALATION) EVERY 6 HOURS PRN
Qty: 8.5 G | Refills: 0 | Status: SHIPPED | OUTPATIENT
Start: 2022-07-27

## 2022-07-27 RX ADMIN — IOHEXOL 75 ML: 350 INJECTION, SOLUTION INTRAVENOUS at 14:48

## 2022-07-27 NOTE — DISCHARGE INSTRUCTIONS
Return to the ER for further concerns or worsening symptoms  Follow up with your primary care physician, GI and Pulmonology in 1-2 days

## 2022-07-27 NOTE — ED PROVIDER NOTES
History  Chief Complaint   Patient presents with    Chest Pain     Midsternal CP that wraps around to the back x1wk  Extensive cardiac hx  Patient here with complaint of increasing dyspnea on exertion, abdominal distension, bilateral lower extremity edema and chest/back pain  Patient has a history of COPD, smokes approximately 1 pack of cigarettes per week, diabetes, nonalcoholic hepatic steatosis  She denies cough, fevers or chills  Patient with no conversational dyspnea at the time of my evaluation  History provided by:  Patient   used: No    Shortness of Breath  Severity:  Moderate  Onset quality:  Gradual  Timing:  Intermittent  Progression:  Waxing and waning  Chronicity:  New  Associated symptoms: chest pain    Associated symptoms: no abdominal pain, no cough, no fever, no neck pain and no vomiting        Prior to Admission Medications   Prescriptions Last Dose Informant Patient Reported? Taking? albuterol (2 5 mg/3 mL) 0 083 % nebulizer solution   Yes No   Sig: Take 2 5 mg by nebulization 4 (four) times a day     aspirin 81 MG tablet   No No   Sig: Take 1 tablet by mouth daily for 30 days   atorvastatin (LIPITOR) 40 mg tablet   No No   Sig: Take 1 tablet (40 mg total) by mouth daily for 30 days   metoprolol succinate (TOPROL-XL) 25 mg 24 hr tablet   No No   Sig: Take 1 tablet (25 mg total) by mouth daily for 30 days      Facility-Administered Medications: None       Past Medical History:   Diagnosis Date    COPD (chronic obstructive pulmonary disease) ([facility] )     [facility]     not on meds    H/O cardiac catheterization     MRSA (methicillin resistant Staphylococcus aureus)     Nonalcoholic fatty liver disease        Past Surgical History:   Procedure Laterality Date    ABDOMINAL SURGERY      tumor in intestine that ruptured as child    WA COLONOSCOPY FLX DX W/COLLJ SPEC WHEN PFRMD N/A 8/3/2016    Procedure: COLONOSCOPY;  Surgeon: José Miguel Ross MD; [facility]; Service: Gastroenterology    TONSILLECTOMY         Family History   Problem Relation Age of Onset    Heart disease Mother     Hypertension Mother     Diabetes Father     Stroke Maternal Grandmother      I have reviewed and agree with the history as documented  E-Cigarette/Vaping    E-Cigarette Use Never User      E-Cigarette/Vaping Substances    Nicotine No     THC No     CBD No     Flavoring No     Other No     Unknown No      Social History     Tobacco Use    Smoking status: Current Every Day Smoker     Packs/day: 0 25     Last attempt to quit: 2011     Years since quittin 5    Smokeless tobacco: Never Used   Vaping Use    Vaping Use: Never used   Substance Use Topics    Alcohol use: No    Drug use: No       Review of Systems   Constitutional: Negative for chills and fever  Respiratory: Negative for cough, chest tightness and shortness of breath  Gastrointestinal: Negative for abdominal pain, diarrhea, nausea and vomiting  Genitourinary: Negative for dysuria, frequency, hematuria and urgency  Musculoskeletal: Positive for back pain  Negative for neck pain and neck stiffness  All other systems reviewed and are negative  Physical Exam  Physical Exam  Vitals and nursing note reviewed  Constitutional:       General: She is not in acute distress  Appearance: She is well-developed  She is not diaphoretic  HENT:      Head: Normocephalic and atraumatic  Eyes:      Conjunctiva/sclera: Conjunctivae normal       Pupils: Pupils are equal, round, and reactive to light  Cardiovascular:      Rate and Rhythm: Normal rate and regular rhythm  Heart sounds: Normal heart sounds  No murmur heard  Pulmonary:      Effort: Pulmonary effort is normal  No respiratory distress  Breath sounds: Rales present  No decreased breath sounds, wheezing or rhonchi  Abdominal:      General: Bowel sounds are normal  There is no distension        Palpations: Abdomen is soft  Tenderness: There is no abdominal tenderness  Musculoskeletal:         General: No deformity  Normal range of motion  Cervical back: Normal range of motion and neck supple  Right lower leg: Edema present  Left lower leg: Edema present  Skin:     General: Skin is warm and dry  Capillary Refill: Capillary refill takes less than 2 seconds  Coloration: Skin is not pale  Findings: No rash  Neurological:      General: No focal deficit present  Mental Status: She is alert and oriented to person, place, and time  Cranial Nerves: No cranial nerve deficit  Psychiatric:         Mood and Affect: Mood is anxious           Behavior: Behavior normal          Vital Signs  ED Triage Vitals   Temperature Pulse Respirations Blood Pressure SpO2   07/27/22 1100 07/27/22 1100 07/27/22 1100 07/27/22 1110 07/27/22 1100   98 3 °F (36 8 °C) 104 (!) 26 153/86 96 %      Temp Source Heart Rate Source Patient Position - Orthostatic VS BP Location FiO2 (%)   07/27/22 1100 07/27/22 1330 07/27/22 1110 07/27/22 1110 --   Tympanic Monitor Sitting Right arm       Pain Score       07/27/22 1110       8           Vitals:    07/27/22 1100 07/27/22 1110 07/27/22 1200 07/27/22 1330   BP:  153/86 130/71 151/77   Pulse: 104 96 90 86   Patient Position - Orthostatic VS:  Sitting           Visual Acuity      ED Medications  Medications   iohexol (OMNIPAQUE) 350 MG/ML injection (MULTI-DOSE) 75 mL (75 mL Intravenous Given 7/27/22 1448)       Diagnostic Studies  Results Reviewed     Procedure Component Value Units Date/Time    Comprehensive metabolic panel [[id number]]  (Abnormal) Collected: 07/27/22 1124    Lab Status: Final result Specimen: Blood from Arm, Right Updated: 07/27/22 1420     Sodium 138 mmol/L      Potassium 4 5 mmol/L      Chloride 102 mmol/L      CO2 24 mmol/L      ANION GAP 12 mmol/L      BUN 11 mg/dL      Creatinine 0 77 mg/dL      Glucose 105 mg/dL      Calcium 9 1 mg/dL Corrected Calcium 9 6 mg/dL      AST 35 U/L      ALT 61 U/L      Alkaline Phosphatase 135 U/L      Total Protein 7 6 g/dL      Albumin 3 4 g/dL      Total Bilirubin 0 34 mg/dL      eGFR 84 ml/min/1 73sq m     Narrative:      [place] guidelines for Chronic Kidney Disease (CKD):     Stage 1 with normal or high GFR (GFR > 90 mL/min/1 73 square meters)    Stage 2 Mild CKD (GFR = 60-89 mL/min/1 73 square meters)    Stage 3A Moderate CKD (GFR = 45-59 mL/min/1 73 square meters)    Stage 3B Moderate CKD (GFR = 30-44 mL/min/1 73 square meters)    Stage 4 Severe CKD (GFR = 15-29 mL/min/1 73 square meters)    Stage 5 End Stage CKD (GFR <15 mL/min/1 73 square meters)  Note: GFR calculation is accurate only with a steady state creatinine    HS Troponin I 2hr [[id number]] Collected: 07/27/22 1324    Lab Status: Final result Specimen: Blood from Hand, Right Updated: 07/27/22 1406     hs TnI 2hr <2 ng/L      Delta 2hr hsTnI --    D-Dimer [[id number]]  (Abnormal) Collected: 07/27/22 1124    Lab Status: Final result Specimen: Blood from Arm, Right Updated: 07/27/22 1329     D-Dimer, Quant 0 66 ug/ml FEU     Narrative: In the evaluation for possible pulmonary embolism, in the appropriate (Well's Score of 4 or less) patient, the age adjusted d-dimer cutoff for this patient can be calculated as:    Age x 0 01 (in ug/mL) for Age-adjusted D-dimer exclusion threshold for a patient over 50 years      HS Troponin 0hr (reflex protocol) [[id number]]  (Normal) Collected: 07/27/22 1124    Lab Status: Final result Specimen: Blood from Arm, Right Updated: 07/27/22 1158     hs TnI 0hr <2 ng/L     NT-BNP PRO [[id number]]  (Normal) Collected: 07/27/22 1124    Lab Status: Final result Specimen: Blood from Arm, Right Updated: 07/27/22 1157     NT-proBNP 57 pg/mL     Protime-INR [[id number]]  (Normal) Collected: 07/27/22 1124    Lab Status: Final result Specimen: Blood from Arm, Right Updated: 07/27/22 1144     Protime 12 2 seconds      INR 0 89    APTT [[id number]]  (Normal) Collected: 07/27/22 1124    Lab Status: Final result Specimen: Blood from Arm, Right Updated: 07/27/22 1144     PTT 36 seconds     CBC and differential [[id number]]  (Abnormal) Collected: 07/27/22 1124    Lab Status: Final result Specimen: Blood from Arm, Right Updated: 07/27/22 1132     WBC 11 14 Thousand/uL      RBC 4 70 Million/uL      Hemoglobin 14 0 g/dL      Hematocrit 43 3 %      MCV 92 fL      MCH 29 8 pg      MCHC 32 3 g/dL      RDW 14 4 %      MPV 10 7 fL      Platelets 503 Thousands/uL      nRBC 0 /100 WBCs      Neutrophils Relative 57 %      Immat GRANS % 0 %      Lymphocytes Relative 33 %      Monocytes Relative 7 %      Eosinophils Relative 2 %      Basophils Relative 1 %      Neutrophils Absolute 6 32 Thousands/µL      Immature Grans Absolute 0 04 Thousand/uL      Lymphocytes Absolute 3 69 Thousands/µL      Monocytes Absolute 0 82 Thousand/µL      Eosinophils Absolute 0 18 Thousand/µL      Basophils Absolute 0 09 Thousands/µL                  CT pe study w abdomen pelvis w contrast   Final Result by Ryder Wilson MD (07/27 1608)      CHEST:      1  No acute findings in the chest  No filling defect to suggest acute or chronic pulmonary embolism in the entire pulmonary arterial system  Measured RV/LV ratio is within normal limits at less than 0 9      2   Scattered small sub-6 mm groundglass nodules in the upper lobes  Based on current Fleischner Society 2017 Guidelines on incidental pulmonary nodule, multiple purely groundglass nodules all of less than 6 mm in size are usually benign, but short-term    interval noncontrast CT follow-up at 3-6 months is recommended; if nodules persist, additional follow-up would be recommended for selected high risk patients at 2 and 4 years after initial examination  ABDOMEN:      1  No acute findings in the abdomen or pelvis     2   Incidentally noted 1 3 cm left adrenal nodule which is new from prior study, with indeterminate imaging features  In patients with no cancer history and new/enlarging adrenal nodule, recommend adrenal mass protocol CT to characterize, and biochemical    evaluation and depending on rate of growth, surgical resection (without biopsy) to treat possible adrenal cortical carcinoma may be warranted  Adrenal recommendation based on institutional consensus and Journal of [employer] of Radiology    2017;14:6031-0239  3   Hepatomegaly with severe hepatic steatosis  The study was marked in EPIC for significant notification  Workstation performed: [id number]         XR chest 1 view portable   Final Result by Flavia Munguia MD (07/27 0888)      No acute cardiopulmonary disease        Findings are stable            Workstation performed: [id number]                    Procedures  ECG 12 Lead Documentation Only    Date/Time: 7/27/2022 11:10 AM  Performed by: Teresa Giraldo DO  Authorized by: Teresa Giraldo DO     Indications / Diagnosis:  Sob  ECG reviewed by me, the ED Provider: yes    Patient location:  ED  Previous ECG:     Previous ECG:  Unavailable    Comparison to cardiac monitor: Yes    Interpretation:     Interpretation: non-specific    Rate:     ECG rate:  93bpm    ECG rate assessment: normal    Rhythm:     Rhythm: sinus rhythm    Ectopy:     Ectopy: none    QRS:     QRS axis:  Normal  Conduction:     Conduction: normal    ST segments:     ST segments:  Normal  T waves:     T waves: normal               ED Course                       PERC Rule for PE    Flowsheet Row Most Recent Value   PERC Rule for PE    Age >=50 1 Filed at: 07/27/2022 1350   HR >=100 0 Filed at: 07/27/2022 1350   O2 Sat on room air < 95% 0 Filed at: 07/27/2022 1350   History of PE or DVT 0 Filed at: 07/27/2022 1350   Recent trauma or surgery 0 Filed at: 07/27/2022 1350   Hemoptysis 0 Filed at: 07/27/2022 1350   Exogenous estrogen 0 Filed at: 07/27/2022 1350   Unilateral leg swelling 0 Filed at: 07/27/2022 1350   PERC Rule for PE Results 1 Filed at: 07/27/2022 1350                  Wells' Criteria for PE    Flowsheet Row Most Recent Value   Wells' Criteria for PE    Clinical signs and symptoms of DVT 3 Filed at: 07/27/2022 1342   PE is primary diagnosis or equally likely 3 Filed at: 07/27/2022 1342   HR >100 0 Filed at: 07/27/2022 1342   Immobilization at least 3 days or Surgery in the previous 4 weeks 0 Filed at: 07/27/2022 1342   Previous, objectively diagnosed PE or DVT 0 Filed at: 07/27/2022 1342   Hemoptysis 0 Filed at: 07/27/2022 1342   Malignancy with treatment within 6 months or palliative 0 Filed at: 07/27/2022 1342   Harvinder' Criteria Total 6 Filed at: 07/27/2022 1342                [facility]  Number of Diagnoses or Management Options  Adrenal nodule ([facility] ): new and requires workup  Dyspnea: new and requires workup  Hepatomegaly: new and requires workup  History of COPD: new and requires workup  Pulmonary nodules: new and requires workup     Amount and/or Complexity of Data Reviewed  Clinical lab tests: ordered and reviewed  Tests in the radiology section of CPT®: ordered and reviewed    Risk of Complications, Morbidity, and/or Mortality  Presenting problems: high  Diagnostic procedures: high  Management options: high    Patient Progress  Patient progress: improved      Disposition  Final diagnoses:   Dyspnea   Pulmonary nodules   Adrenal nodule ([facility] )   Hepatomegaly   History of COPD     Time reflects when diagnosis was documented in both MDM as applicable and the Disposition within this note     Time User Action Codes Description Comment    7/27/2022  3:58 PM [place] Sevin Add [R06 00] Dyspnea     7/27/2022  3:58 PM [place] Sevin Add [R91 8] Pulmonary nodules     7/27/2022  3:58 PM Sagar Sevin O Add [E27 8] Adrenal nodule ([facility] )     7/27/2022  3:58 PM [place] Sevin Add [R16 0] Hepatomegaly     7/27/2022  3:59 PM Sagar Sevin Add [Z87 09] History of COPD       ED Disposition     ED Disposition   Discharge    Condition   Stable    Date/Time   Wed Jul 27, 2022  3:58 PM    Comment   Stevie Felling discharge to home/self care  Follow-up Information     Follow up With Specialties Details Why Contact Info Additional Information    [address]  [facility] Schedule an appointment as soon as possible for a visit in 2 days  [facility] [address] [facility] Schedule an appointment as soon as possible for a visit in 2 days for follow up [address] [facility] [address] [place], [place], [address] [facility] Schedule an appointment as soon as possible for a visit in 2 days for follow up [address] [facility] [address] [facility]  [id number]  Ll[facility] Gutierrez [facility] [facility] [address], [address] [phone]          Patient's Medications   Discharge Prescriptions    ALBUTEROL (PROAIR HFA) 90 MCG/ACT INHALER    Inhale 2 puffs every 6 (six) hours as needed for wheezing       Start Date: 7/27/2022 End Date: --       Order Dose: 2 puffs       Quantity: 8 5 g    Refills: 0       No discharge procedures on file      PDMP Review     None          ED Provider  Electronically Signed by           Lyudmila Cruz DO  08/02/22 [id number]

## 2022-07-28 LAB
ATRIAL RATE: 93 BPM
P AXIS: 60 DEGREES
PR INTERVAL: 148 MS
QRS AXIS: -1 DEGREES
QRSD INTERVAL: 66 MS
QT INTERVAL: 330 MS
QTC INTERVAL: 410 MS
T WAVE AXIS: 25 DEGREES
VENTRICULAR RATE: 93 BPM

## 2022-07-28 PROCEDURE — 93010 ELECTROCARDIOGRAM REPORT: CPT | Performed by: INTERNAL MEDICINE

## 2022-10-25 ENCOUNTER — APPOINTMENT (EMERGENCY)
Dept: RADIOLOGY | Facility: HOSPITAL | Age: 57
End: 2022-10-25
Payer: COMMERCIAL

## 2022-10-25 ENCOUNTER — HOSPITAL ENCOUNTER (EMERGENCY)
Facility: HOSPITAL | Age: 57
Discharge: HOME | End: 2022-10-25
Attending: EMERGENCY MEDICINE
Payer: COMMERCIAL

## 2022-10-25 VITALS
RESPIRATION RATE: 22 BRPM | OXYGEN SATURATION: 96 % | SYSTOLIC BLOOD PRESSURE: 140 MMHG | HEART RATE: 84 BPM | DIASTOLIC BLOOD PRESSURE: 72 MMHG | TEMPERATURE: 98.3 F

## 2022-10-25 DIAGNOSIS — R07.89 CHEST PRESSURE: ICD-10-CM

## 2022-10-25 DIAGNOSIS — I10 HYPERTENSION: Primary | ICD-10-CM

## 2022-10-25 LAB
ALBUMIN SERPL BCP-MCNC: 3.3 G/DL (ref 3.5–5)
ALP SERPL-CCNC: 121 U/L (ref 46–116)
ALT SERPL W P-5'-P-CCNC: 31 U/L (ref 12–78)
ANION GAP SERPL CALCULATED.3IONS-SCNC: 7 MMOL/L (ref 4–13)
AST SERPL W P-5'-P-CCNC: 16 U/L (ref 5–45)
BASOPHILS # BLD AUTO: 0.09 THOUSANDS/ÂΜL (ref 0–0.1)
BASOPHILS NFR BLD AUTO: 1 % (ref 0–1)
BILIRUB SERPL-MCNC: 0.2 MG/DL (ref 0.2–1)
BUN SERPL-MCNC: 13 MG/DL (ref 5–25)
CALCIUM ALBUM COR SERPL-MCNC: 9.4 MG/DL (ref 8.3–10.1)
CALCIUM SERPL-MCNC: 8.8 MG/DL (ref 8.3–10.1)
CARDIAC TROPONIN I PNL SERPL HS: <2 NG/L
CHLORIDE SERPL-SCNC: 106 MMOL/L (ref 96–108)
CO2 SERPL-SCNC: 26 MMOL/L (ref 21–32)
CREAT SERPL-MCNC: 0.73 MG/DL (ref 0.6–1.3)
EOSINOPHIL # BLD AUTO: 0.26 THOUSAND/ÂΜL (ref 0–0.61)
EOSINOPHIL NFR BLD AUTO: 3 % (ref 0–6)
ERYTHROCYTE [DISTWIDTH] IN BLOOD BY AUTOMATED COUNT: 14 % (ref 11.6–15.1)
GFR SERPL CREATININE-BSD FRML MDRD: 90 ML/MIN/1.73SQ M
GLUCOSE SERPL-MCNC: 103 MG/DL (ref 65–140)
HCT VFR BLD AUTO: 40 % (ref 34.8–46.1)
HGB BLD-MCNC: 13 G/DL (ref 11.5–15.4)
IMM GRANULOCYTES # BLD AUTO: 0.04 THOUSAND/UL (ref 0–0.2)
IMM GRANULOCYTES NFR BLD AUTO: 0 % (ref 0–2)
LYMPHOCYTES # BLD AUTO: 2.83 THOUSANDS/ÂΜL (ref 0.6–4.47)
LYMPHOCYTES NFR BLD AUTO: 29 % (ref 14–44)
MCH RBC QN AUTO: 29.9 PG (ref 26.8–34.3)
MCHC RBC AUTO-ENTMCNC: 32.5 G/DL (ref 31.4–37.4)
MCV RBC AUTO: 92 FL (ref 82–98)
MONOCYTES # BLD AUTO: 0.67 THOUSAND/ÂΜL (ref 0.17–1.22)
MONOCYTES NFR BLD AUTO: 7 % (ref 4–12)
NEUTROPHILS # BLD AUTO: 5.93 THOUSANDS/ÂΜL (ref 1.85–7.62)
NEUTS SEG NFR BLD AUTO: 60 % (ref 43–75)
NRBC BLD AUTO-RTO: 0 /100 WBCS
PLATELET # BLD AUTO: 284 THOUSANDS/UL (ref 149–390)
PMV BLD AUTO: 10.2 FL (ref 8.9–12.7)
POTASSIUM SERPL-SCNC: 3.6 MMOL/L (ref 3.5–5.3)
PROT SERPL-MCNC: 7.3 G/DL (ref 6.4–8.4)
RBC # BLD AUTO: 4.35 MILLION/UL (ref 3.81–5.12)
SODIUM SERPL-SCNC: 139 MMOL/L (ref 135–147)
WBC # BLD AUTO: 9.82 THOUSAND/UL (ref 4.31–10.16)

## 2022-10-25 PROCEDURE — 84484 ASSAY OF TROPONIN QUANT: CPT | Performed by: EMERGENCY MEDICINE

## 2022-10-25 PROCEDURE — 36415 COLL VENOUS BLD VENIPUNCTURE: CPT | Performed by: EMERGENCY MEDICINE

## 2022-10-25 PROCEDURE — 71045 X-RAY EXAM CHEST 1 VIEW: CPT

## 2022-10-25 PROCEDURE — 85025 COMPLETE CBC W/AUTO DIFF WBC: CPT | Performed by: EMERGENCY MEDICINE

## 2022-10-25 PROCEDURE — 80053 COMPREHEN METABOLIC PANEL: CPT | Performed by: EMERGENCY MEDICINE

## 2022-10-25 PROCEDURE — 93005 ELECTROCARDIOGRAM TRACING: CPT

## 2022-10-25 RX ORDER — AMLODIPINE BESYLATE 5 MG/1
5 TABLET ORAL DAILY
Qty: 14 TABLET | Refills: 0 | Status: SHIPPED | OUTPATIENT
Start: 2022-10-25 | End: 2022-11-08

## 2022-10-25 RX ORDER — ASPIRIN 325 MG
325 TABLET ORAL ONCE
Status: COMPLETED | OUTPATIENT
Start: 2022-10-25 | End: 2022-10-25

## 2022-10-25 RX ADMIN — ASPIRIN 325 MG: 325 TABLET ORAL at 13:14

## 2022-10-25 RX ADMIN — SODIUM CHLORIDE 1000 ML: 0.9 INJECTION, SOLUTION INTRAVENOUS at 13:23

## 2022-10-25 NOTE — DISCHARGE INSTRUCTIONS
We have performed an evaluation of your chest pain in the emergency department and determined that you can be safely discharged home  We have provided you with general information about chest pain in adults  We recommend that you follow up with your primary care provider in the next 5-7 days for further evaluation  If your chest pain changes, worsens, if you have significant associated shortness of breath, palpitations, diaphoresis, persistent nausea and vomiting, or if you pass out, return to the emergency department immediately  We have given you a prescription for amlodipine for your high blood pressure  We have additionally given referral to Cardiology  They should call you today or tomorrow about follow-up  If you do not hear from them  in the next 2 days, call the provided number to schedule follow-up    It is extremely important that you follow up with Cardiology

## 2022-10-25 NOTE — ED PROVIDER NOTES
History  Chief Complaint   Patient presents with   • Hypertension     Pt states her blood pressure has been running high for a few weeks, and c/o chest pressure      HPI  Patient is a 58-year-old female history of COPD, diabetes, CAD, nonalcoholic fatty liver disease presenting for evaluation of multiple complaints including hypertension for the past few weeks, exertional chest pressure for “a long time” which has worsened over the past week  Patient states she notices symptoms primarily when walking up steps  Patient denies any symptoms at rest   Patient denies nausea, vomiting, diaphoresis, syncope, states that she has had some lightheadedness with onset of symptoms  Patient denies lower extremity pain or swelling, recent immobilization or surgery, history of DVT/PE  Patient denies fevers, chills, cough, recent travel or sick contacts  Prior to Admission Medications   Prescriptions Last Dose Informant Patient Reported? Taking? albuterol (2 5 mg/3 mL) 0 083 % nebulizer solution   Yes No   Sig: Take 2 5 mg by nebulization 4 (four) times a day     albuterol (ProAir HFA) 90 mcg/act inhaler   No No   Sig: Inhale 2 puffs every 6 (six) hours as needed for wheezing   aspirin 81 MG tablet   No No   Sig: Take 1 tablet by mouth daily for 30 days   atorvastatin (LIPITOR) 40 mg tablet   No No   Sig: Take 1 tablet (40 mg total) by mouth daily for 30 days   metoprolol succinate (TOPROL-XL) 25 mg 24 hr tablet   No No   Sig: Take 1 tablet (25 mg total) by mouth daily for 30 days      Facility-Administered Medications: None       Past Medical History:   Diagnosis Date   • COPD (chronic obstructive pulmonary disease) (HCC)    • Diabetes mellitus (HCC)     not on meds   • H/O cardiac catheterization    • MRSA (methicillin resistant Staphylococcus aureus)    • Nonalcoholic fatty liver disease        Past Surgical History:   Procedure Laterality Date   • ABDOMINAL SURGERY      tumor in intestine that ruptured as child   • [place] COLONOSCOPY FLX DX W/COLLJ SPEC WHEN PFRMD N/A 8/3/2016    Procedure: COLONOSCOPY;  Surgeon: Ector Thomason MD;  Location: [facility]; Service: Gastroenterology   • TONSILLECTOMY         Family History   Problem Relation Age of Onset   • Heart disease Mother    • Hypertension Mother    • Diabetes Father    • Stroke Maternal Grandmother      I have reviewed and agree with the history as documented  E-Cigarette/Vaping   • E-Cigarette Use Never User      E-Cigarette/Vaping Substances   • Nicotine No    • THC No    • CBD No    • Flavoring No    • Other No    • Unknown No      Social History     Tobacco Use   • Smoking status: Current Every Day Smoker     Packs/day: 0 25     Last attempt to quit: 2011     Years since quittin 7   • Smokeless tobacco: Never Used   Vaping Use   • Vaping Use: Never used   Substance Use Topics   • Alcohol use: No   • Drug use: No       Review of Systems   Constitutional: Negative for chills, fatigue and fever  HENT: Negative for sore throat  Eyes: Negative for visual disturbance  Respiratory: Positive for shortness of breath  Negative for cough and chest tightness  Cardiovascular: Positive for chest pain  Gastrointestinal: Negative for abdominal distention, abdominal pain, constipation, diarrhea, nausea and vomiting  Endocrine: Negative for polydipsia and polyuria  Genitourinary: Negative for dysuria and hematuria  Musculoskeletal: Negative for arthralgias and myalgias  Skin: Negative for color change and rash  Neurological: Negative for dizziness and headaches  Psychiatric/Behavioral: Negative for confusion  All other systems reviewed and are negative  Physical Exam  Physical Exam  Vitals reviewed  Constitutional:       General: She is not in acute distress  Appearance: She is well-developed  She is not diaphoretic  Comments: Well-appearing, nondistressed   HENT:      Head: Normocephalic and atraumatic        Comments: Moist mucous membranes     Right Ear: External ear normal       Left Ear: External ear normal       Nose: Nose normal       Mouth/Throat:      Pharynx: No oropharyngeal exudate  Eyes:      Pupils: Pupils are equal, round, and reactive to light  Cardiovascular:      Rate and Rhythm: Normal rate and regular rhythm  Heart sounds: Normal heart sounds  No murmur heard  No friction rub  No gallop  Pulmonary:      Effort: Pulmonary effort is normal  No respiratory distress  Breath sounds: Normal breath sounds  No wheezing  Comments: No increased work of breathing  Speaking complete sentences  Satting 96% on room air indicating adequate oxygenation  Lungs clear to auscultation bilaterally without wheezes, rales rhonchi  Chest:      Chest wall: No tenderness  Abdominal:      General: Bowel sounds are normal  There is no distension  Palpations: Abdomen is soft  There is no mass  Tenderness: There is no abdominal tenderness  There is no guarding  Musculoskeletal:         General: No deformity  Normal range of motion  Cervical back: Normal range of motion  Comments: No lower extremity swelling, erythema, or calf tenderness  Lymphadenopathy:      Cervical: No cervical adenopathy  Skin:     General: Skin is warm and dry  Capillary Refill: Capillary refill takes less than 2 seconds  Neurological:      Mental Status: She is alert and oriented to person, place, and time           Vital Signs  ED Triage Vitals   Temperature Pulse Respirations Blood Pressure SpO2   10/25/22 1243 10/25/22 1245 10/25/22 1245 10/25/22 1245 10/25/22 1245   98 3 °F (36 8 °C) (!) 115 20 144/84 98 %      Temp Source Heart Rate Source Patient Position - Orthostatic VS BP Location FiO2 (%)   10/25/22 1243 10/25/22 1245 10/25/22 1245 10/25/22 1245 --   Oral Monitor Lying Right arm       Pain Score       10/25/22 1328       6           Vitals:    10/25/22 1245 10/25/22 1315   BP: 144/84    Pulse: (!) 115 90 Patient Position - Orthostatic VS: Lying          Visual Acuity      ED Medications  Medications   sodium chloride 0 9 % bolus 1,000 mL (1,000 mL Intravenous New Bag 10/25/22 1323)   aspirin tablet 325 mg (325 mg Oral Given 10/25/22 1314)       Diagnostic Studies  Results Reviewed     Procedure Component Value Units Date/Time    CBC and differential [[id number]] Collected: 10/25/22 1314    Lab Status: Final result Specimen: Blood from Line, Venous Updated: 10/25/22 1325     WBC 9 82 Thousand/uL      RBC 4 35 Million/uL      Hemoglobin 13 0 g/dL      Hematocrit 40 0 %      MCV 92 fL      MCH 29 9 pg      MCHC 32 5 g/dL      RDW 14 0 %      MPV 10 2 fL      Platelets 901 Thousands/uL      nRBC 0 /100 WBCs      Neutrophils Relative 60 %      Immat GRANS % 0 %      Lymphocytes Relative 29 %      Monocytes Relative 7 %      Eosinophils Relative 3 %      Basophils Relative 1 %      Neutrophils Absolute 5 93 Thousands/µL      Immature Grans Absolute 0 04 Thousand/uL      Lymphocytes Absolute 2 83 Thousands/µL      Monocytes Absolute 0 67 Thousand/µL      Eosinophils Absolute 0 26 Thousand/µL      Basophils Absolute 0 09 Thousands/µL     HS Troponin 0hr (reflex protocol) [[id number]] Collected: 10/25/22 1314    Lab Status: In process Specimen: Blood from Line, Venous Updated: 10/25/22 1322    Comprehensive metabolic panel [[id number]] Collected: 10/25/22 1314    Lab Status: In process Specimen: Blood from Line, Venous Updated: 10/25/22 1322                 XR chest 1 view portable    (Results Pending)              Procedures  Procedures         ED Course                                             MDM  Number of Diagnoses or Management Options  Chest pressure  Hypertension  Diagnosis management comments: Acute on chronic exertional chest pressure and shortness of breath  Patient well-appearing with normal vital signs  Nonfocal cardiopulmonary exam   Treated with dose of aspirin in the emergency department    Lab evaluation including CBC, CMP, troponin all unremarkable  Nonischemic EKG and unremarkable chest x-ray  Patient with heart score of 4  Provided with ambulatory referral to Cardiology, discharged with verbal and written return precautions  Disposition  Final diagnoses:   None     ED Disposition     None      Follow-up Information    None         Patient's Medications   Discharge Prescriptions    No medications on file       No discharge procedures on file      PDMP Review     None          ED Provider  Electronically Signed by           Leon Diaz MD  10/25/22 0598

## 2022-10-28 LAB
ATRIAL RATE: 88 BPM
P AXIS: 50 DEGREES
PR INTERVAL: 144 MS
QRS AXIS: -3 DEGREES
QRSD INTERVAL: 80 MS
QT INTERVAL: 346 MS
QTC INTERVAL: 418 MS
T WAVE AXIS: 21 DEGREES
VENTRICULAR RATE: 88 BPM

## 2022-11-15 DIAGNOSIS — I10 HYPERTENSION: ICD-10-CM

## 2022-11-15 RX ORDER — AMLODIPINE BESYLATE 5 MG/1
5 TABLET ORAL DAILY
Qty: 14 TABLET | Refills: 0 | Status: SHIPPED | OUTPATIENT
Start: 2022-11-15 | End: 2022-11-29

## 2022-11-15 NOTE — TELEPHONE ENCOUNTER
Patient has an appt on Dec 6 with Dr Rosie Ortega for follow up from ER they prescribed Amlodipine for patient she ran out 1 week ago  She would like to know if she can get a refill until she comes in

## 2022-12-06 ENCOUNTER — CONSULT (OUTPATIENT)
Dept: CARDIOLOGY CLINIC | Facility: CLINIC | Age: 57
End: 2022-12-06

## 2022-12-06 VITALS
HEIGHT: 62 IN | TEMPERATURE: 98.2 F | SYSTOLIC BLOOD PRESSURE: 138 MMHG | OXYGEN SATURATION: 96 % | BODY MASS INDEX: 39.2 KG/M2 | HEART RATE: 113 BPM | WEIGHT: 213 LBS | DIASTOLIC BLOOD PRESSURE: 80 MMHG

## 2022-12-06 DIAGNOSIS — R07.89 CHEST PRESSURE: Primary | ICD-10-CM

## 2022-12-06 DIAGNOSIS — I10 ESSENTIAL HYPERTENSION: ICD-10-CM

## 2022-12-06 RX ORDER — METOPROLOL SUCCINATE 25 MG/1
25 TABLET, EXTENDED RELEASE ORAL DAILY
Qty: 90 TABLET | Refills: 2 | Status: SHIPPED | OUTPATIENT
Start: 2022-12-06 | End: 2023-01-05

## 2022-12-06 NOTE — PROGRESS NOTES
Consultation - Cardiology Office  [facility]  Sawyer Tam 59 y o  female MRN: [id number]  : 1965  Unit/Bed#:  Encounter: [id number]      ASSESSMENT:  Shortness of breath  Multifactorial including morbid obesity  According to the patient she has gained a lot of weight in the last 2 years  Also contributed to by deconditioning and underlying asthma  Will reassess LV function with echocardiogram   Previously had a relatively normal cardiac catheterization    Hypertension  BP today is 138/80 mmHg  Ran out of Toprol that was prescribed in the ED    Diabetes mellitus    Obesity, BMI 38 96    COPD    Nonalcoholic fatty liver disease    History of chest pain    Pharmacologic stress test, 2017:  Normal study with EF of 70%    Cardiac catheterization 2015  Very mild / 25% CAD      RECOMMENDATIONS:  Resume Toprol-XL 25 mg daily  Continue aspirin and atorvastatin  Lipid profile and LFTs  Echocardiogram  Strongly encouraged on diet, exercise and weight loss            Thank you for your consultation  If you have any question please call me at [phone]      Primary Care Physician Requesting Consult: Bonifacio Bejarano MD      Reason for Consult / Principal Problem: Shortness of        HPI :     Sawyer Tam is a 59y o  year old female who was referred by primary care doctor for shortness of breath  Patient was seen in the ED with hypertension and shortness of breath  Her cardiac enzymes were normal   She was started on Toprol-XL which she ran out of a few weeks ago  Her blood pressure is mildly elevated today  She was evaluated in  with a cardiac catheterization with it revealed that she had very minimal/about 25% CAD  According to the patient and she has gained a lot of weight in the last 2 years, which besides deconditioning or probably contributing to her dyspnea on exertion  Review of Systems   Respiratory: Positive for shortness of breath      All other systems reviewed and are negative  Historical Information   Past Medical History:   Diagnosis Date   • COPD (chronic obstructive pulmonary disease) ([facility] [address] )    • Diabetes mellitus ([facility] [address] )     not on meds   • H/O cardiac catheterization    • MRSA (methicillin resistant Staphylococcus aureus)    • Nonalcoholic fatty liver disease      Past Surgical History:   Procedure Laterality Date   • ABDOMINAL SURGERY      tumor in intestine that ruptured as child   • [place] COLONOSCOPY FLX DX W/COLLJ SPEC WHEN PFRMD N/A 8/3/2016    Procedure: COLONOSCOPY;  Surgeon: Radha Joyner MD;  Location: [facility];   Service: Gastroenterology   • TONSILLECTOMY       Social History     Substance and Sexual Activity   Alcohol Use No     Social History     Substance and Sexual Activity   Drug Use No     Social History     Tobacco Use   Smoking Status Every Day   • Packs/day: 0 25   • Types: Cigarettes   • Last attempt to quit: 2011   • Years since quittin 8   Smokeless Tobacco Never     Family History:   Family History   Problem Relation Age of Onset   • Heart disease Mother    • Hypertension Mother    • Diabetes Father    • Stroke Maternal Grandmother        Meds/Allergies     Allergies   Allergen Reactions   • Bactrim [Sulfamethoxazole-Trimethoprim] Rash   • Codeine Rash       Current Outpatient Medications:   •  albuterol (2 5 mg/3 mL) 0 083 % nebulizer solution, Take 2 5 mg by nebulization 4 (four) times a day , Disp: , Rfl:   •  albuterol (ProAir HFA) 90 mcg/act inhaler, Inhale 2 puffs every 6 (six) hours as needed for wheezing, Disp: 8 5 g, Rfl: 0  •  metoprolol succinate (TOPROL-XL) 25 mg 24 hr tablet, Take 1 tablet (25 mg total) by mouth daily, Disp: 90 tablet, Rfl: 2  •  amLODIPine (NORVASC) 5 mg tablet, Take 1 tablet (5 mg total) by mouth daily for 14 days, Disp: 14 tablet, Rfl: 0  •  aspirin 81 MG tablet, Take 1 tablet by mouth daily for 30 days, Disp: 30 tablet, Rfl: 0  •  atorvastatin (LIPITOR) 40 mg tablet, Take 1 tablet (40 mg total) by mouth daily for 30 days, Disp: 30 tablet, Rfl: 0    Vitals: Blood pressure 138/80, pulse (!) 113, temperature 98 2 °F (36 8 °C), height 5' 2" (1 575 m), weight 96 6 kg (213 lb), SpO2 96 %  ?  Body mass index is 38 96 kg/m²  Vitals:    22 1409   Weight: 96 6 kg (213 lb)     BP Readings from Last 3 Encounters:   22 138/80   10/25/22 140/72   22 151/77       PHYSICAL EXAMINATION:  Neurologic:  Alert & oriented x 3, no new focal deficits, Not in any acute distress,  Constitutional: Morbidly obese  Eyes:  Pupil equal and reacting to light, conjunctiva normal, No JVP, No LNP   HENT:  Atraumatic, oropharynx moist, Neck- normal range of motion, no tenderness,  Neck supple   Respiratory:  Bilateral air entry, mostly clear to auscultation  Cardiovascular: S1-S2 regular with a I/VI systolic murmur   GI:  Soft, nondistended, normal bowel sounds, nontender, no hepatosplenomegaly appreciated  Musculoskeletal: no tenderness, no deformities  Skin:  Well hydrated, no rash   Lymphatic:  No lymphadenopathy noted   Extremities: Trace edema and distal pulses are present    Diagnostic Studies Review Cardio:      EKG:   Sinus tachycardia, heart rate 113/min  Cannot rule out inferior infarct and anterior infarct of undetermined age    Cardiac testing:   No results found for this or any previous visit        Results for orders placed during the hospital encounter of 17    NM myocardial perfusion spect (rx stress and/or rest)    [address]  [address] [facility] [phone]    Rest/Stress Gated SPECT Myocardial Perfusion Imaging After Regadenoson    Patient: Keely Lopez  MR number: [id number]  Account number: [de-identified]  : 1965  Age: 46 years  Gender: Female  Status: Outpatient  Location: Stress lab  Height: 61 in  Weight: 165 lb  BP: 116/ 81 mmHg    Allergies: SULFAMETHOXAZOLE-TRIMETHOPRIM, CODEINE    Diagnosis: R00 2 - Palpitations, R06 02 - Shortness of breath, R07 9 - Chest pain, unspecified    Primary Physician:  Deven Tineo MD  RN:  NARA Naranjo  Group:  Hilda Paris  Report Prepared By[[email or web address]-identified]  NARA Naranjo  Interpreting Physician:  Junie Giron MD    INDICATIONS: Evaluation of known coronary artery disease  HISTORY: The patient is a 46year old  female  Chest pain status: chest pain  Other symptoms: dyspnea and palpitations  Coronary artery disease risk factors: family history of premature coronary artery disease and diabetes  mellitus  Cardiovascular history: coronary artery disease  Prior cardiovascular procedures: coronary angiogram (on 2015)  Co-morbidity: history of lung disease- COPD and obesity  Medications: aspirin  PHYSICAL EXAM: Baseline physical exam screening: no wheezes audible  REST ECG: Normal sinus rhythm  PROCEDURE: The study was performed in the stress lab  A regadenoson infusion pharmacologic stress test was performed  Gated SPECT myocardial perfusion imaging was performed after stress and at rest  Systolic blood pressure was 116 mmHg, at  the start of the study  Diastolic blood pressure was 81 mmHg, at the start of the study  The heart rate was 73 bpm, at the start of the study  IV double checked  Regadenoson protocol:  Time HR bpm SBP mmHg DBP mmHg Symptoms Rhythm/conduct  Baseline 11:22 73 116 81 none NSR  Immediate 11:26 113 124 66 chest discomfort sinus tach  1 min 11:27 101 124 72 same as above --  2 min 11:28 95 126 75 subsiding --  3 min 11:29 90 125 78 none --  patient refused treadmill exercise  No medications or fluids given  STRESS SUMMARY: Duration of pharmacologic stress was 3 min  Maximal heart rate during stress was 113 bpm  The heart rate response to stress was normal  There was normal resting blood pressure with an appropriate response to stress  The  rate-pressure product for the peak heart rate and blood pressure was 44264   The patient experienced chest pain during stress; pain resolved spontaneously  The stress test was terminated due to protocol completion  Pre oxygen saturation: 97  %  Peak oxygen saturation: 97 %  The stress ECG was negative for ischemia and normal  There were no stress arrhythmias or conduction abnormalities  ISOTOPE ADMINISTRATION:  The radiopharmaceutical was injected at the peak effect of pharmacologic stress  MYOCARDIAL PERFUSION IMAGING:  The image quality was good  Left ventricular size was normal     PERFUSION DEFECTS:  -  There were no perfusion defects  GATED SPECT:  The calculated left ventricular ejection fraction was 70 %  Left ventricular ejection fraction was within normal limits by visual estimate  SUMMARY:  -  Stress results: The patient experienced chest pain during stress; pain resolved spontaneously  -  ECG conclusions: The stress ECG was negative for ischemia and normal   -  Perfusion imaging: There were no perfusion defects   -  Gated SPECT: The calculated left ventricular ejection fraction was 70 %  Left ventricular ejection fraction was within normal limits by visual estimate  IMPRESSIONS: Normal study after vasodilation and low level exercise  There were no significant perfusion abnormalities  Left ventricular systolic function was normal     Prepared and signed by    Domonique Esteban MD  Signed 01/17/2017 15:19:35        Imaging:  Chest X-Ray:   No Chest XR results available for this patient  CT-scan of the chest:     No CTA results available for this patient    Lab Review   Lab Results   Component Value Date    WBC 9 82 10/25/2022    HGB 13 0 10/25/2022    HCT 40 0 10/25/2022    MCV 92 10/25/2022    RDW 14 0 10/25/2022     10/25/2022     BMP:  Lab Results   Component Value Date    SODIUM 139 10/25/2022    K 3 6 10/25/2022     10/25/2022    CO2 26 10/25/2022    BUN 13 10/25/2022    CREATININE 0 73 10/25/2022    GLUC 103 10/25/2022    CALCIUM 8 8 10/25/2022    CORRECTEDCA 9 4 10/25/2022    EGFR 90 10/25/2022    MG 2 0 01/17/2017     LFT:  Lab Results   Component Value Date    AST 16 10/25/2022    ALT 31 10/25/2022    ALKPHOS 121 (H) 10/25/2022    TP 7 3 10/25/2022    ALB 3 3 (L) 10/25/2022      No results found for: [id number]  No components found for: [employer]  Lab Results   Component Value Date    HGBA1C 5 7 01/17/2017     Lipid Profile:   Lab Results   Component Value Date    CHOLESTEROL 166 01/16/2017    HDL 64 (H) 01/16/2017    LDLCALC 89 01/16/2017    TRIG 67 01/16/2017     Lab Results   Component Value Date    CHOLESTEROL 166 01/16/2017     Lab Results   Component Value Date    TROPONINI <0 02 01/17/2017     Lab Results   Component Value Date    NTBNP 57 07/27/2022      No results found for this or any previous visit (from the past 672 hour(s))  Dr Don Lopez MD, [facility]      "This note has been constructed using a voice recognition system  Therefore there may be syntax, spelling, and/or grammatical errors   Please call if you have any questions  "

## 2023-05-15 ENCOUNTER — HOSPITAL ENCOUNTER (EMERGENCY)
Facility: HOSPITAL | Age: 58
Discharge: HOME | End: 2023-05-15
Attending: EMERGENCY MEDICINE

## 2023-05-15 VITALS
OXYGEN SATURATION: 97 % | WEIGHT: 213 LBS | DIASTOLIC BLOOD PRESSURE: 102 MMHG | TEMPERATURE: 96.8 F | HEART RATE: 111 BPM | HEIGHT: 62 IN | BODY MASS INDEX: 39.2 KG/M2 | RESPIRATION RATE: 18 BRPM | SYSTOLIC BLOOD PRESSURE: 171 MMHG

## 2023-05-15 DIAGNOSIS — L02.91 ABSCESS: Primary | ICD-10-CM

## 2023-05-15 RX ORDER — CLINDAMYCIN HYDROCHLORIDE 150 MG/1
300 CAPSULE ORAL 3 TIMES DAILY
Qty: 42 CAPSULE | Refills: 0 | Status: SHIPPED | OUTPATIENT
Start: 2023-05-15 | End: 2023-05-22

## 2023-05-15 NOTE — ED PROVIDER NOTES
History  Chief Complaint   Patient presents with   • Abscess     States she has an abscess on right palm and thinks it's MRSA     Pt is a right-hand-dominant 58 yo F with PMH of MRSA, COPD, Diet controlled DM, MRSA and SCOTT who presents for evaluation of pain and abscess of right hand  Patient states that she does not recall any injury to the area  She states that the lesion was first noted yesterday and has continued to worsen  She describes 5 out of 10 pain, without radiation  She has not noted any relapsing or remitting factors  She has had MRSA in the past and expresses concerns that this is a recurrence of her MRSA  She specifically denies fever, chills, nausea, vomiting, anorexia headache or fatigue  Abscess  Associated symptoms: no fever and no vomiting        Prior to Admission Medications   Prescriptions Last Dose Informant Patient Reported? Taking? albuterol (2 5 mg/3 mL) 0 083 % nebulizer solution   Yes No   Sig: Take 2 5 mg by nebulization 4 (four) times a day     albuterol (ProAir HFA) 90 mcg/act inhaler   No No   Sig: Inhale 2 puffs every 6 (six) hours as needed for wheezing   amLODIPine (NORVASC) 5 mg tablet   No No   Sig: Take 1 tablet (5 mg total) by mouth daily for 14 days   aspirin 81 MG tablet   No No   Sig: Take 1 tablet by mouth daily for 30 days   atorvastatin (LIPITOR) 40 mg tablet   No No   Sig: Take 1 tablet (40 mg total) by mouth daily for 30 days   metoprolol succinate (TOPROL-XL) 25 mg 24 hr tablet   No No   Sig: Take 1 tablet (25 mg total) by mouth daily      Facility-Administered Medications: None       Past Medical History:   Diagnosis Date   • COPD (chronic obstructive pulmonary disease) (HCC)    • Diabetes mellitus (HCC)     not on meds   • H/O cardiac catheterization    • MRSA (methicillin resistant Staphylococcus aureus)    • Nonalcoholic fatty liver disease        Past Surgical History:   Procedure Laterality Date   • ABDOMINAL SURGERY      tumor in intestine that ruptured as child   • RI COLONOSCOPY FLX DX W/COLLJ SPEC WHEN PFRMD N/A 8/3/2016    Procedure: COLONOSCOPY;  Surgeon: Luca Kaplan MD;  Location: [facility]; Service: Gastroenterology   • TONSILLECTOMY         Family History   Problem Relation Age of Onset   • Heart disease Mother    • Hypertension Mother    • Diabetes Father    • Stroke Maternal Grandmother      I have reviewed and agree with the history as documented  E-Cigarette/Vaping   • E-Cigarette Use Never User      E-Cigarette/Vaping Substances   • Nicotine No    • THC No    • CBD No    • Flavoring No    • Other No    • Unknown No      Social History     Tobacco Use   • Smoking status: Every Day     Packs/day: 0 25     Types: Cigarettes     Last attempt to quit: 2011     Years since quittin 3   • Smokeless tobacco: Never   Vaping Use   • Vaping Use: Never used   Substance Use Topics   • Alcohol use: No   • Drug use: No       Review of Systems   Constitutional: Negative for chills and fever  HENT: Negative for ear pain and sore throat  Eyes: Negative for pain and visual disturbance  Respiratory: Negative for cough and shortness of breath  Cardiovascular: Negative for chest pain and palpitations  Gastrointestinal: Negative for abdominal pain and vomiting  Endocrine: Negative  Genitourinary: Negative for dysuria and hematuria  Musculoskeletal: Negative for arthralgias and back pain  Skin: Positive for wound  Negative for color change and rash  Allergic/Immunologic: Negative  Neurological: Negative  Negative for seizures and syncope  Hematological: Negative  Psychiatric/Behavioral: Negative  All other systems reviewed and are negative  Physical Exam  Physical Exam  Vitals and nursing note reviewed  Constitutional:       General: She is not in acute distress  Appearance: Normal appearance  She is well-developed  HENT:      Head: Normocephalic and atraumatic        Nose: Nose normal       Mouth/Throat: Mouth: Mucous membranes are moist    Eyes:      General: No scleral icterus  Conjunctiva/sclera: Conjunctivae normal       Pupils: Pupils are equal, round, and reactive to light  Cardiovascular:      Rate and Rhythm: Normal rate and regular rhythm  Pulses: Normal pulses  Heart sounds: Normal heart sounds  No murmur heard  Pulmonary:      Effort: Pulmonary effort is normal  No respiratory distress  Breath sounds: Normal breath sounds  Abdominal:      Palpations: Abdomen is soft  Tenderness: There is no abdominal tenderness  Musculoskeletal:         General: Swelling and tenderness present  Normal range of motion  Hands:       Right lower leg: No edema  Left lower leg: No edema  Skin:     General: Skin is warm and dry  Capillary Refill: Capillary refill takes less than 2 seconds  Neurological:      General: No focal deficit present  Mental Status: She is alert and oriented to person, place, and time     Psychiatric:         Mood and Affect: Mood normal          Vital Signs  ED Triage Vitals [05/15/23 1433]   Temperature Pulse Respirations Blood Pressure SpO2   (!) 96 8 °F (36 °C) (!) 111 18 (!) 171/102 97 %      Temp src Heart Rate Source Patient Position - Orthostatic VS BP Location FiO2 (%)   -- -- -- -- --      Pain Score       No Pain           Vitals:    05/15/23 1433   BP: (!) 171/102   Pulse: (!) 111         Visual Acuity      ED Medications  Medications - No data to display    Diagnostic Studies  Results Reviewed     Procedure Component Value Units Date/Time    Wound culture and Gram stain [[id number]]  (Abnormal) Collected: 05/15/23 1531    Lab Status: Final result Specimen: Wound from Hand, Right Updated: 05/17/23 1002     Wound Culture 3+ Growth of Beta Hemolytic Streptococcus Group A     Gram Stain Result 1+ Polys      4+ Gram positive cocci in pairs                 No orders to display              Procedures  Incision and drain    Date/Time: "5/17/2023 5:41 PM  Performed by: Michael Aponte PA-C  Authorized by: Michael Aponte PA-C   Universal Protocol:  Consent: Verbal consent obtained  Risks and benefits: risks, benefits and alternatives were discussed  Consent given by: patient  Time out: Immediately prior to procedure a \"time out\" was called to verify the correct patient, procedure, equipment, support staff and site/side marked as required  Patient identity confirmed: verbally with patient      Patient location:  ED  Location:     Type:  Fluid collection    Size:  1 5    Location:  Upper extremity    Upper extremity location:  R hand  Pre-procedure details:     Skin preparation:  Chloraprep  Anesthesia (see MAR for exact dosages): Anesthesia method:  None  Procedure details:     Complexity:  Simple    Incision types:  Stab incision    Approach:  Open    Incision depth:  Subcutaneous    Wound management:  Irrigated with saline    Drainage:  Serosanguinous    Drainage amount: Moderate    Wound treatment:  Wound left open    Packing materials:  None  Post-procedure details:     Patient tolerance of procedure: Tolerated well, no immediate complications             ED Course                               SBIRT 22yo+    Flowsheet Row Most Recent Value   Initial Alcohol Screen: US AUDIT-C     1  How often do you have a drink containing alcohol? 0 Filed at: 05/15/2023 1433   2  How many drinks containing alcohol do you have on a typical day you are drinking? 0 Filed at: 05/15/2023 1433   3a  Male UNDER 65: How often do you have five or more drinks on one occasion? 0 Filed at: 05/15/2023 1433   3b  FEMALE Any Age, or MALE 65+: How often do you have 4 or more drinks on one occassion? 0 Filed at: 05/15/2023 1433   Audit-C Score 0 Filed at: 05/15/2023 1433   PEPE: How many times in the past year have you    Used an illegal drug or used a prescription medication for non-medical reasons?  Never Filed at: 05/15/2023 1433    " Medical Decision Making  Abscess: acute illness or injury     Details: Patient presents with fluid collection on right hand with surrounding erythema and tenderness  I&D of fluid collection shows serosanguineous fluid that was sent for culture  Patient has history of MRSA and has allergy to Bactrim  Discharged with clindamycin  Patient educated on red flag signs and symptoms that would necessitate return to the ED  Amount and/or Complexity of Data Reviewed  Labs: ordered  Risk  Prescription drug management  Disposition  Final diagnoses:   Abscess     Time reflects when diagnosis was documented in both MDM as applicable and the Disposition within this note     Time User Action Codes Description Comment    5/15/2023  3:28 PM Romayne Nyhan Add [L02 91] Abscess       ED Disposition     ED Disposition   Discharge    Condition   Stable    Date/Time   Mon May 15, 2023  3:28 PM    [address] discharge to home/self care                 Follow-up Information     Follow up With Specialties Details Why Contact Info Additional [address] [facility] Emergency Department Emergency Medicine Go to  If symptoms worsen [address] [address] [address] [address] Emergency Department, [place], [place], [address]    Primary care doctor  Call  As needed            Discharge Medication List as of 5/15/2023  3:31 PM      START taking these medications    Details   clindamycin (CLEOCIN) 150 mg capsule Take 2 capsules (300 mg total) by mouth 3 (three) times a day for 7 days, Starting Mon 5/15/2023, Until Mon 5/22/2023, Normal         CONTINUE these medications which have NOT CHANGED    Details   albuterol (2 5 mg/3 mL) 0 083 % nebulizer solution Take 2 5 mg by nebulization 4 (four) times a day , Until Discontinued, Historical Med      albuterol (ProAir HFA) 90 mcg/act inhaler Inhale 2 puffs every 6 (six) hours as needed for wheezing, Starting Wed 7/27/2022, Normal      amLODIPine (NORVASC) 5 mg tablet Take 1 tablet (5 mg total) by mouth daily for 14 days, Starting Tue 11/15/2022, Until Tue 11/29/2022, Normal      aspirin 81 MG tablet Take 1 tablet by mouth daily for 30 days, Starting 1/17/2017, Until Thu 2/16/17, No Print      atorvastatin (LIPITOR) 40 mg tablet Take 1 tablet (40 mg total) by mouth daily for 30 days, Starting Fri 3/9/2018, Until Sun 4/8/2018, No Print      metoprolol succinate (TOPROL-XL) 25 mg 24 hr tablet Take 1 tablet (25 mg total) by mouth daily, Starting Tue 12/6/2022, Until Thu 1/5/2023, Normal             No discharge procedures on file      PDMP Review     None          ED Provider  Electronically Signed by           Michael Aponte PA-C  05/17/23 2809

## 2023-05-15 NOTE — Clinical Note
Terese Rankin was seen and treated in our emergency department on 5/15/2023  Diagnosis:     Delia Graves  may return to work on return date  She may return on this date: 05/17/2023         If you have any questions or concerns, please don't hesitate to call        Shazia Trinh PA-C    ______________________________           _______________          _______________  Hospital Representative                              Date                                Time

## 2023-05-17 LAB
BACTERIA WND AEROBE CULT: ABNORMAL
GRAM STN SPEC: ABNORMAL
GRAM STN SPEC: ABNORMAL

## 2023-09-01 DIAGNOSIS — I10 HYPERTENSION: ICD-10-CM

## 2023-09-01 DIAGNOSIS — I10 ESSENTIAL HYPERTENSION: ICD-10-CM

## 2023-09-04 RX ORDER — METOPROLOL SUCCINATE 25 MG/1
25 TABLET, EXTENDED RELEASE ORAL DAILY
Qty: 90 TABLET | Refills: 2 | Status: SHIPPED | OUTPATIENT
Start: 2023-09-04 | End: 2023-10-04

## 2023-11-09 ENCOUNTER — APPOINTMENT (EMERGENCY)
Dept: RADIOLOGY | Facility: HOSPITAL | Age: 58
End: 2023-11-09
Payer: COMMERCIAL

## 2023-11-09 ENCOUNTER — HOSPITAL ENCOUNTER (EMERGENCY)
Facility: HOSPITAL | Age: 58
Discharge: HOME | End: 2023-11-09
Attending: EMERGENCY MEDICINE
Payer: COMMERCIAL

## 2023-11-09 VITALS
RESPIRATION RATE: 20 BRPM | DIASTOLIC BLOOD PRESSURE: 82 MMHG | OXYGEN SATURATION: 98 % | TEMPERATURE: 98.7 F | SYSTOLIC BLOOD PRESSURE: 157 MMHG | HEART RATE: 87 BPM

## 2023-11-09 DIAGNOSIS — R07.9 CHEST PAIN: Primary | ICD-10-CM

## 2023-11-09 DIAGNOSIS — J44.9 CHRONIC OBSTRUCTIVE PULMONARY DISEASE, UNSPECIFIED COPD TYPE (HCC): ICD-10-CM

## 2023-11-09 LAB
2HR DELTA HS TROPONIN: -1 NG/L
ALBUMIN SERPL BCP-MCNC: 4.3 G/DL (ref 3.5–5)
ALP SERPL-CCNC: 93 U/L (ref 34–104)
ALT SERPL W P-5'-P-CCNC: 27 U/L (ref 7–52)
ANION GAP SERPL CALCULATED.3IONS-SCNC: 9 MMOL/L
AST SERPL W P-5'-P-CCNC: 17 U/L (ref 13–39)
BASOPHILS # BLD AUTO: 0.07 THOUSANDS/ÂΜL (ref 0–0.1)
BASOPHILS NFR BLD AUTO: 1 % (ref 0–1)
BILIRUB SERPL-MCNC: 0.3 MG/DL (ref 0.2–1)
BNP SERPL-MCNC: 24 PG/ML (ref 0–100)
BUN SERPL-MCNC: 13 MG/DL (ref 5–25)
CALCIUM SERPL-MCNC: 9.3 MG/DL (ref 8.4–10.2)
CARDIAC TROPONIN I PNL SERPL HS: 2 NG/L
CARDIAC TROPONIN I PNL SERPL HS: 3 NG/L
CHLORIDE SERPL-SCNC: 107 MMOL/L (ref 96–108)
CO2 SERPL-SCNC: 23 MMOL/L (ref 21–32)
CREAT SERPL-MCNC: 0.66 MG/DL (ref 0.6–1.3)
D DIMER PPP FEU-MCNC: 0.78 UG/ML FEU
EOSINOPHIL # BLD AUTO: 0.2 THOUSAND/ÂΜL (ref 0–0.61)
EOSINOPHIL NFR BLD AUTO: 2 % (ref 0–6)
ERYTHROCYTE [DISTWIDTH] IN BLOOD BY AUTOMATED COUNT: 14.6 % (ref 11.6–15.1)
GFR SERPL CREATININE-BSD FRML MDRD: 96 ML/MIN/1.73SQ M
GLUCOSE SERPL-MCNC: 106 MG/DL (ref 65–140)
HCT VFR BLD AUTO: 40.9 % (ref 34.8–46.1)
HGB BLD-MCNC: 13.8 G/DL (ref 11.5–15.4)
IMM GRANULOCYTES # BLD AUTO: 0.04 THOUSAND/UL (ref 0–0.2)
IMM GRANULOCYTES NFR BLD AUTO: 0 % (ref 0–2)
LIPASE SERPL-CCNC: 54 U/L (ref 11–82)
LYMPHOCYTES # BLD AUTO: 3.49 THOUSANDS/ÂΜL (ref 0.6–4.47)
LYMPHOCYTES NFR BLD AUTO: 31 % (ref 14–44)
MAGNESIUM SERPL-MCNC: 1.9 MG/DL (ref 1.9–2.7)
MCH RBC QN AUTO: 30.3 PG (ref 26.8–34.3)
MCHC RBC AUTO-ENTMCNC: 33.7 G/DL (ref 31.4–37.4)
MCV RBC AUTO: 90 FL (ref 82–98)
MONOCYTES # BLD AUTO: 0.73 THOUSAND/ÂΜL (ref 0.17–1.22)
MONOCYTES NFR BLD AUTO: 6 % (ref 4–12)
NEUTROPHILS # BLD AUTO: 6.79 THOUSANDS/ÂΜL (ref 1.85–7.62)
NEUTS SEG NFR BLD AUTO: 60 % (ref 43–75)
NRBC BLD AUTO-RTO: 0 /100 WBCS
PLATELET # BLD AUTO: 355 THOUSANDS/UL (ref 149–390)
PMV BLD AUTO: 10.1 FL (ref 8.9–12.7)
POTASSIUM SERPL-SCNC: 3.7 MMOL/L (ref 3.5–5.3)
PROT SERPL-MCNC: 7.7 G/DL (ref 6.4–8.4)
RBC # BLD AUTO: 4.56 MILLION/UL (ref 3.81–5.12)
SODIUM SERPL-SCNC: 139 MMOL/L (ref 135–147)
WBC # BLD AUTO: 11.32 THOUSAND/UL (ref 4.31–10.16)

## 2023-11-09 PROCEDURE — 96375 TX/PRO/DX INJ NEW DRUG ADDON: CPT

## 2023-11-09 PROCEDURE — 93005 ELECTROCARDIOGRAM TRACING: CPT

## 2023-11-09 PROCEDURE — G1004 CDSM NDSC: HCPCS

## 2023-11-09 PROCEDURE — 99285 EMERGENCY DEPT VISIT HI MDM: CPT

## 2023-11-09 PROCEDURE — 80053 COMPREHEN METABOLIC PANEL: CPT | Performed by: EMERGENCY MEDICINE

## 2023-11-09 PROCEDURE — 71045 X-RAY EXAM CHEST 1 VIEW: CPT

## 2023-11-09 PROCEDURE — 94640 AIRWAY INHALATION TREATMENT: CPT

## 2023-11-09 PROCEDURE — 96374 THER/PROPH/DIAG INJ IV PUSH: CPT

## 2023-11-09 PROCEDURE — 99285 EMERGENCY DEPT VISIT HI MDM: CPT | Performed by: EMERGENCY MEDICINE

## 2023-11-09 PROCEDURE — 85379 FIBRIN DEGRADATION QUANT: CPT | Performed by: EMERGENCY MEDICINE

## 2023-11-09 PROCEDURE — 84484 ASSAY OF TROPONIN QUANT: CPT | Performed by: EMERGENCY MEDICINE

## 2023-11-09 PROCEDURE — 85025 COMPLETE CBC W/AUTO DIFF WBC: CPT | Performed by: EMERGENCY MEDICINE

## 2023-11-09 PROCEDURE — 74174 CTA ABD&PLVS W/CONTRAST: CPT

## 2023-11-09 PROCEDURE — 83880 ASSAY OF NATRIURETIC PEPTIDE: CPT | Performed by: EMERGENCY MEDICINE

## 2023-11-09 PROCEDURE — 83735 ASSAY OF MAGNESIUM: CPT | Performed by: EMERGENCY MEDICINE

## 2023-11-09 PROCEDURE — 71275 CT ANGIOGRAPHY CHEST: CPT

## 2023-11-09 PROCEDURE — 36415 COLL VENOUS BLD VENIPUNCTURE: CPT | Performed by: EMERGENCY MEDICINE

## 2023-11-09 PROCEDURE — 83690 ASSAY OF LIPASE: CPT | Performed by: EMERGENCY MEDICINE

## 2023-11-09 RX ORDER — IPRATROPIUM BROMIDE AND ALBUTEROL SULFATE 2.5; .5 MG/3ML; MG/3ML
3 SOLUTION RESPIRATORY (INHALATION) ONCE
Status: COMPLETED | OUTPATIENT
Start: 2023-11-09 | End: 2023-11-09

## 2023-11-09 RX ORDER — IPRATROPIUM BROMIDE AND ALBUTEROL SULFATE 2.5; .5 MG/3ML; MG/3ML
3 SOLUTION RESPIRATORY (INHALATION) ONCE
Status: DISCONTINUED | OUTPATIENT
Start: 2023-11-09 | End: 2023-11-09

## 2023-11-09 RX ORDER — MORPHINE SULFATE 4 MG/ML
4 INJECTION, SOLUTION INTRAMUSCULAR; INTRAVENOUS ONCE
Status: COMPLETED | OUTPATIENT
Start: 2023-11-09 | End: 2023-11-09

## 2023-11-09 RX ORDER — NAPROXEN 500 MG/1
500 TABLET ORAL 2 TIMES DAILY WITH MEALS
Qty: 30 TABLET | Refills: 0 | Status: SHIPPED | OUTPATIENT
Start: 2023-11-09

## 2023-11-09 RX ORDER — ALBUTEROL SULFATE 90 UG/1
2 AEROSOL, METERED RESPIRATORY (INHALATION) EVERY 6 HOURS PRN
Qty: 8.5 G | Refills: 0 | Status: SHIPPED | OUTPATIENT
Start: 2023-11-09

## 2023-11-09 RX ORDER — PREDNISONE 20 MG/1
60 TABLET ORAL DAILY
Qty: 15 TABLET | Refills: 0 | Status: SHIPPED | OUTPATIENT
Start: 2023-11-09 | End: 2023-11-14

## 2023-11-09 RX ORDER — METHYLPREDNISOLONE SODIUM SUCCINATE 125 MG/2ML
125 INJECTION, POWDER, LYOPHILIZED, FOR SOLUTION INTRAMUSCULAR; INTRAVENOUS ONCE
Status: COMPLETED | OUTPATIENT
Start: 2023-11-09 | End: 2023-11-09

## 2023-11-09 RX ADMIN — MORPHINE SULFATE 4 MG: 4 INJECTION INTRAVENOUS at 13:34

## 2023-11-09 RX ADMIN — IOHEXOL 100 ML: 350 INJECTION, SOLUTION INTRAVENOUS at 13:02

## 2023-11-09 RX ADMIN — METHYLPREDNISOLONE SODIUM SUCCINATE 125 MG: 125 INJECTION, POWDER, FOR SOLUTION INTRAMUSCULAR; INTRAVENOUS at 13:34

## 2023-11-09 RX ADMIN — IPRATROPIUM BROMIDE AND ALBUTEROL SULFATE 3 ML: 2.5; .5 SOLUTION RESPIRATORY (INHALATION) at 13:34

## 2023-11-09 NOTE — ED PROVIDER NOTES
History  Chief Complaint   Patient presents with    Chest Pain     Pt c/o l sided upper chest pain started last night " pressure like pain" pt also reports of feeling tingling in the l hand started at 0400 today     67-year-old female presents with left-sided upper chest pain radiating straight to her back that started last night sharp continuous currently a 6 out of 10 breathing makes it worse nothing makes it better no trauma noted. History of COPD diabetes not in care for doctor because of lack of insurance. History provided by:  Patient   used: No        Prior to Admission Medications   Prescriptions Last Dose Informant Patient Reported? Taking? albuterol (2.5 mg/3 mL) 0.083 % nebulizer solution   Yes No   Sig: Take 2.5 mg by nebulization 4 (four) times a day. albuterol (ProAir HFA) 90 mcg/act inhaler   No No   Sig: Inhale 2 puffs every 6 (six) hours as needed for wheezing   amLODIPine (NORVASC) 5 mg tablet   No No   Sig: Take 1 tablet (5 mg total) by mouth daily for 14 days   aspirin 81 MG tablet   No No   Sig: Take 1 tablet by mouth daily for 30 days   atorvastatin (LIPITOR) 40 mg tablet   No No   Sig: Take 1 tablet (40 mg total) by mouth daily for 30 days   metoprolol succinate (TOPROL-XL) 25 mg 24 hr tablet   No No   Sig: Take 1 tablet (25 mg total) by mouth daily      Facility-Administered Medications: None       Past Medical History:   Diagnosis Date    COPD (chronic obstructive pulmonary disease) ([address])     Diabetes mellitus ([address])     not on meds    H/O cardiac catheterization     MRSA (methicillin resistant Staphylococcus aureus)     Nonalcoholic fatty liver disease        Past Surgical History:   Procedure Laterality Date    ABDOMINAL SURGERY      tumor in intestine that ruptured as child    WI COLONOSCOPY FLX DX W/COLLJ SPEC WHEN PFRMD N/A 8/3/2016    Procedure: COLONOSCOPY;  Surgeon: Anthony Malagon MD;  Location: [facility];   Service: Gastroenterology    TONSILLECTOMY Family History   Problem Relation Age of Onset    Heart disease Mother     Hypertension Mother     Diabetes Father     Stroke Maternal Grandmother      I have reviewed and agree with the history as documented. E-Cigarette/Vaping    E-Cigarette Use Never User      E-Cigarette/Vaping Substances    Nicotine No     THC No     CBD No     Flavoring No     Other No     Unknown No      Social History     Tobacco Use    Smoking status: Every Day     Packs/day: 0.25     Types: Cigarettes     Last attempt to quit: 2011     Years since quittin.8    Smokeless tobacco: Never   Vaping Use    Vaping Use: Never used   Substance Use Topics    Alcohol use: No    Drug use: No       Review of Systems   Constitutional: Negative. HENT: Negative. Eyes: Negative. Respiratory: Negative. Cardiovascular:  Positive for chest pain. Gastrointestinal: Negative. Endocrine: Negative. Genitourinary: Negative. Musculoskeletal: Negative. Skin: Negative. Allergic/Immunologic: Negative. Neurological: Negative. Hematological: Negative. Psychiatric/Behavioral: Negative. All other systems reviewed and are negative. Physical Exam  Physical Exam  Vitals and nursing note reviewed. Constitutional:       Appearance: Normal appearance. HENT:      Head: Normocephalic and atraumatic. Nose: Nose normal.      Mouth/Throat:      Mouth: Mucous membranes are moist.   Eyes:      Extraocular Movements: Extraocular movements intact. Pupils: Pupils are equal, round, and reactive to light. Cardiovascular:      Rate and Rhythm: Normal rate and regular rhythm. Pulmonary:      Effort: Pulmonary effort is normal.      Breath sounds: Normal breath sounds. Abdominal:      General: Abdomen is flat. Bowel sounds are normal.      Palpations: Abdomen is soft. Musculoskeletal:         General: Normal range of motion. Cervical back: Normal range of motion and neck supple.    Skin:     General: Skin is warm. Capillary Refill: Capillary refill takes less than 2 seconds. Neurological:      General: No focal deficit present. Mental Status: She is alert and oriented to person, place, and time. Mental status is at baseline. Psychiatric:         Mood and Affect: Mood normal.         Thought Content:  Thought content normal.         Vital Signs  ED Triage Vitals   Temperature Pulse Respirations Blood Pressure SpO2   11/09/23 1159 11/09/23 1159 11/09/23 1159 11/09/23 1159 11/09/23 1159   98.7 °F (37.1 °C) 100 20 (!) 172/81 98 %      Temp Source Heart Rate Source Patient Position - Orthostatic VS BP Location FiO2 (%)   11/09/23 1159 11/09/23 1159 11/09/23 1159 11/09/23 1159 --   Oral Monitor Sitting Right arm       Pain Score       11/09/23 1334       9           Vitals:    11/09/23 1159 11/09/23 1534   BP: (!) 172/81 157/82   Pulse: 100 87   Patient Position - Orthostatic VS: Sitting Sitting         Visual Acuity      ED Medications  Medications   ipratropium-albuterol (DUO-NEB) 0.5-2.5 mg/3 mL inhalation solution 3 mL (3 mL Nebulization Given 11/9/23 1334)   methylPREDNISolone sodium succinate (Solu-MEDROL) injection 125 mg (125 mg Intravenous Given 11/9/23 1334)   morphine injection 4 mg (4 mg Intravenous Given 11/9/23 1334)   iohexol (OMNIPAQUE) 350 MG/ML injection (MULTI-DOSE) 100 mL (100 mL Intravenous Given 11/9/23 1302)       Diagnostic Studies  Results Reviewed       Procedure Component Value Units Date/Time    HS Troponin I 2hr [[id number]]  (Normal) Collected: 11/09/23 1421    Lab Status: Final result Specimen: Blood from Arm, Right Updated: 11/09/23 1450     hs TnI 2hr 2 ng/L      Delta 2hr hsTnI -1 ng/L     HS Troponin 0hr (reflex protocol) [[id number]]  (Normal) Collected: 11/09/23 1222    Lab Status: Final result Specimen: Blood from Arm, Right Updated: 11/09/23 1250     hs TnI 0hr 3 ng/L     B-Type Natriuretic Peptide(BNP) [[id number]]  (Normal) Collected: 11/09/23 1222    Lab Status: Final result Specimen: Blood from Arm, Right Updated: 11/09/23 1249     BNP 24 pg/mL     Comprehensive metabolic panel [[id number]] Collected: 11/09/23 1222    Lab Status: Final result Specimen: Blood from Arm, Right Updated: 11/09/23 1244     Sodium 139 mmol/L      Potassium 3.7 mmol/L      Chloride 107 mmol/L      CO2 23 mmol/L      ANION GAP 9 mmol/L      BUN 13 mg/dL      Creatinine 0.66 mg/dL      Glucose 106 mg/dL      Calcium 9.3 mg/dL      AST 17 U/L      ALT 27 U/L      Alkaline Phosphatase 93 U/L      Total Protein 7.7 g/dL      Albumin 4.3 g/dL      Total Bilirubin 0.30 mg/dL      eGFR 96 ml/min/1.73sq m     Narrative:      [facility] guidelines for Chronic Kidney Disease (CKD):     Stage 1 with normal or high GFR (GFR > 90 mL/min/1.73 square meters)    Stage 2 Mild CKD (GFR = 60-89 mL/min/1.73 square meters)    Stage 3A Moderate CKD (GFR = 45-59 mL/min/1.73 square meters)    Stage 3B Moderate CKD (GFR = 30-44 mL/min/1.73 square meters)    Stage 4 Severe CKD (GFR = 15-29 mL/min/1.73 square meters)    Stage 5 End Stage CKD (GFR <15 mL/min/1.73 square meters)  Note: GFR calculation is accurate only with a steady state creatinine    Magnesium [[id number]]  (Normal) Collected: 11/09/23 1222    Lab Status: Final result Specimen: Blood from Arm, Right Updated: 11/09/23 1244     Magnesium 1.9 mg/dL     Lipase [[id number]]  (Normal) Collected: 11/09/23 1222    Lab Status: Final result Specimen: Blood from Arm, Right Updated: 11/09/23 1244     Lipase 54 u/L     D-Dimer [[id number]]  (Abnormal) Collected: 11/09/23 1222    Lab Status: Final result Specimen: Blood from Arm, Right Updated: 11/09/23 1241     D-Dimer, Quant 0.78 ug/ml FEU     Narrative:       In the evaluation for possible pulmonary embolism, in the appropriate (Well's Score of 4 or less) patient, the age adjusted d-dimer cutoff for this patient can be calculated as:    Age x 0.01 (in ug/mL) for Age-adjusted D-dimer exclusion threshold for a patient over 50 years. CBC and differential [[id number]]  (Abnormal) Collected: 11/09/23 1222    Lab Status: Final result Specimen: Blood from Arm, Right Updated: 11/09/23 1228     WBC 11.32 Thousand/uL      RBC 4.56 Million/uL      Hemoglobin 13.8 g/dL      Hematocrit 40.9 %      MCV 90 fL      MCH 30.3 pg      MCHC 33.7 g/dL      RDW 14.6 %      MPV 10.1 fL      Platelets 987 Thousands/uL      nRBC 0 /100 WBCs      Neutrophils Relative 60 %      Immat GRANS % 0 %      Lymphocytes Relative 31 %      Monocytes Relative 6 %      Eosinophils Relative 2 %      Basophils Relative 1 %      Neutrophils Absolute 6.79 Thousands/µL      Immature Grans Absolute 0.04 Thousand/uL      Lymphocytes Absolute 3.49 Thousands/µL      Monocytes Absolute 0.73 Thousand/µL      Eosinophils Absolute 0.20 Thousand/µL      Basophils Absolute 0.07 Thousands/µL                    CTA dissection protocol chest/abdomen/pelvis   Final Result by Sejal Cook MD (11/09 1448)      No acute findings in the chest, abdomen or pelvis. No acute aortic findings. No significant atherosclerotic changes. Patent left subclavian and left axillary arteries in this patient with left arm tingling. Workstation performed: [id number]         XR chest 1 view portable   Final Result by Adam Goodman MD (11/09 1433)      No acute cardiopulmonary disease.                   Workstation performed: [id number]                    Procedures  ECG 12 Lead Documentation Only    Date/Time: 11/9/2023 7:30 PM    Performed by: Scottie Bright DO  Authorized by: Scottie Bright DO    ECG reviewed by me, the ED Provider: yes    Patient location:  ED  Previous ECG:     Previous ECG:  Unavailable    Comparison to cardiac monitor: Yes    Interpretation:     Interpretation: normal    Rate:     ECG rate assessment: normal    Rhythm:     Rhythm: sinus rhythm    Ectopy:     Ectopy: none    QRS:     QRS axis:  Normal  Conduction:     Conduction: normal    ST segments:     ST segments:  Normal  T waves:     T waves: normal             ED Course             HEART Risk Score      Flowsheet Row Most Recent Value   Heart Score Risk Calculator    History 0 Filed at: 11/09/2023 1930   ECG 0 Filed at: 11/09/2023 1930   Age 1 Filed at: 11/09/2023 1930   Risk Factors 1 Filed at: 11/09/2023 1930   Troponin 0 Filed at: 11/09/2023 1930   HEART Score 2 Filed at: 11/09/2023 1930                                        Medical Decision Making  Problems Addressed:  Chest pain: acute illness or injury  Chronic obstructive pulmonary disease, unspecified COPD type ([address]): acute illness or injury    Amount and/or Complexity of Data Reviewed  External Data Reviewed: notes. Labs: ordered. Decision-making details documented in ED Course. Radiology: ordered. Decision-making details documented in ED Course. ECG/medicine tests: ordered and independent interpretation performed. Decision-making details documented in ED Course. Risk  Prescription drug management. Disposition  Final diagnoses:   Chest pain   Chronic obstructive pulmonary disease, unspecified COPD type ([address])     Time reflects when diagnosis was documented in both MDM as applicable and the Disposition within this note       Time User Action Codes Description Comment    11/9/2023  3:19 PM Anepu, Jayde Given Add [R07.9] Chest pain     11/9/2023  3:19 PM Anepu, Jayde Given Add [J44.9] Chronic obstructive pulmonary disease, unspecified COPD type [facility])           ED Disposition       ED Disposition   Discharge    Condition   Stable    Date/Time   Thu Nov 9, 2023 [id number] discharge to home/self care.                    Follow-up Information       Follow up With Specialties Details Why Contact Info Additional Information    [address] Emergency Department Emergency Medicine  If symptoms worsen [address] [address]  [address] [facility] Emergency Department, [address] [facility] [address], [place], [address] MD Cardiology   [address]  [phone]               Discharge Medication List as of 11/9/2023  3:20 PM        START taking these medications    Details   !! albuterol (ProAir HFA) 90 mcg/act inhaler Inhale 2 puffs every 6 (six) hours as needed for wheezing, Starting Thu 11/9/2023, Normal      naproxen (Naprosyn) 500 mg tablet Take 1 tablet (500 mg total) by mouth 2 (two) times a day with meals, Starting Thu 11/9/2023, Normal      predniSONE 20 mg tablet Take 3 tablets (60 mg total) by mouth daily for 5 days, Starting Thu 11/9/2023, Until Tue 11/14/2023, Normal       !! - Potential duplicate medications found. Please discuss with provider. CONTINUE these medications which have NOT CHANGED    Details   albuterol (2.5 mg/3 mL) 0.083 % nebulizer solution Take 2.5 mg by nebulization 4 (four) times a day., Until Discontinued, Historical Med      !! albuterol (ProAir HFA) 90 mcg/act inhaler Inhale 2 puffs every 6 (six) hours as needed for wheezing, Starting Wed 7/27/2022, Normal      amLODIPine (NORVASC) 5 mg tablet Take 1 tablet (5 mg total) by mouth daily for 14 days, Starting Tue 11/15/2022, Until Tue 11/29/2022, Normal      aspirin 81 MG tablet Take 1 tablet by mouth daily for 30 days, Starting 1/17/2017, Until Thu 2/16/17, No Print      atorvastatin (LIPITOR) 40 mg tablet Take 1 tablet (40 mg total) by mouth daily for 30 days, Starting Fri 3/9/2018, Until Sun 4/8/2018, No Print      metoprolol succinate (TOPROL-XL) 25 mg 24 hr tablet Take 1 tablet (25 mg total) by mouth daily, Starting Mon 9/4/2023, Until Wed 10/4/2023, Normal       !! - Potential duplicate medications found. Please discuss with provider. Outpatient Discharge Orders   Stress test only, exercise   Standing Status: Future Standing Exp.  Date: 11/09/27       PDMP Review       None ED Provider  Electronically Signed by             Garfield Mercado DO  11/09/23 1931

## 2023-11-11 LAB
ATRIAL RATE: 92 BPM
P AXIS: 64 DEGREES
PR INTERVAL: 154 MS
QRS AXIS: 12 DEGREES
QRSD INTERVAL: 76 MS
QT INTERVAL: 340 MS
QTC INTERVAL: 420 MS
T WAVE AXIS: 32 DEGREES
VENTRICULAR RATE: 92 BPM

## 2023-11-11 PROCEDURE — 93010 ELECTROCARDIOGRAM REPORT: CPT | Performed by: INTERNAL MEDICINE

## 2024-05-25 DIAGNOSIS — I10 ESSENTIAL HYPERTENSION: ICD-10-CM

## 2024-05-28 RX ORDER — METOPROLOL SUCCINATE 25 MG/1
25 TABLET, EXTENDED RELEASE ORAL DAILY
Qty: 90 TABLET | Refills: 0 | Status: SHIPPED | OUTPATIENT
Start: 2024-05-28

## 2024-05-28 NOTE — TELEPHONE ENCOUNTER
Patient called back because she received a message to schedule an appointment. She would like someone to give her a call back to schedule her - she is also asking if this medication can be refilled to get her to that appointment so that she is not without it until she is seen.

## 2024-07-16 NOTE — PROGRESS NOTES
Progress Note - Cardiology Office  [facility]    Selene Bowen 59 y.o. female MRN: [id number]  : 1965  Encounter: [id number]      ASSESSMENT:  Shortness of breath.  Multifactorial including morbid obesity.  Also contributed to by deconditioning and underlying asthma.  Will reassess LV function with echocardiogram.  Previously had a relatively normal cardiac catheterization     Hypertension  BP today is 130/84 mmHg  On Toprol-XL 25 mg daily     Diabetes mellitus     Obesity, BMI 38.96-> 35.46     COPD     Nonalcoholic fatty liver disease     History of chest pain     Pharmacologic stress test, 2017:  Normal study with EF of 70%     Cardiac catheterization 2015  Very mild / 25% CAD        RECOMMENDATIONS:  Continue Toprol-XL 25 mg daily  Reordered lipid profile, LFTs, echocardiogram and stress test which were ordered previously but never had done by the patient   Advised on diet, exercise and weight loss      Please call [phone] if any questions.    HPI :     Selene Bowen is a 59 y.o. year old female who came for follow up. Patient has she denies any specific cardiac symptoms.  She was seen previously with shortness of breath and chest pain and cardiac testing and labs were ordered which she did not have done since she had lost her medical insurance which has now been reinstituted.  As such I am reordering the tests and the labs.  Currently she is only on metoprolol/Toprol 25 mg daily we will reassess need for other medications after testing and labs are done    REVIEW OF SYSTEMS:  Denies any new or acute cardiac symptoms.  Has baseline dyspnea on exertion and occasional chest pain    Historical Information   Past Medical History:   Diagnosis Date    COPD (chronic obstructive pulmonary disease) (HCC)     Diabetes mellitus (HCC)     not on meds    H/O cardiac catheterization     MRSA (methicillin resistant Staphylococcus aureus)     Nonalcoholic fatty liver  disease      Past Surgical History:   Procedure Laterality Date    ABDOMINAL SURGERY      tumor in intestine that ruptured as child    DC COLONOSCOPY FLX DX W/COLLJ SPEC WHEN PFRMD N/A 8/3/2016    Procedure: COLONOSCOPY;  Surgeon: Stephanie Mera MD;  Location: [facility];  Service: Gastroenterology    TONSILLECTOMY       Social History     Substance and Sexual Activity   Alcohol Use No     Social History     Substance and Sexual Activity   Drug Use No     Social History     Tobacco Use   Smoking Status Every Day    Current packs/day: 0.00    Types: Cigarettes    Last attempt to quit: 2011    Years since quittin.5   Smokeless Tobacco Never     Family History:   Family History   Problem Relation Age of Onset    Heart disease Mother     Hypertension Mother     Diabetes Father     Stroke Maternal Grandmother        Meds/Allergies     Allergies   Allergen Reactions    Codeine Rash    Sulfamethoxazole-Trimethoprim Rash       Current Outpatient Medications:     metoprolol succinate (TOPROL-XL) 25 mg 24 hr tablet, Take 1 tablet (25 mg total) by mouth daily, Disp: 90 tablet, Rfl: 2    albuterol (2.5 mg/3 mL) 0.083 % nebulizer solution, Take 2.5 mg by nebulization 4 (four) times a day. (Patient not taking: Reported on 2024), Disp: , Rfl:     albuterol (ProAir HFA) 90 mcg/act inhaler, Inhale 2 puffs every 6 (six) hours as needed for wheezing (Patient not taking: Reported on 2024), Disp: 8.5 g, Rfl: 0    amLODIPine (NORVASC) 5 mg tablet, Take 1 tablet (5 mg total) by mouth daily for 14 days, Disp: 14 tablet, Rfl: 0    aspirin 81 MG tablet, Take 1 tablet by mouth daily for 30 days, Disp: 30 tablet, Rfl: 0    atorvastatin (LIPITOR) 40 mg tablet, Take 1 tablet (40 mg total) by mouth daily for 30 days, Disp: 30 tablet, Rfl: 0    naproxen (Naprosyn) 500 mg tablet, Take 1 tablet (500 mg total) by mouth 2 (two) times a day with meals (Patient not taking: Reported on 2024), Disp: 30 tablet, Rfl: 0    Vitals:  "Blood pressure 130/84, pulse 95, height 5' 2.5\" (1.588 m), weight 89.4 kg (197 lb), SpO2 97%.    Body mass index is 35.46 kg/m².  Vitals:    24 1030   Weight: 89.4 kg (197 lb)     BP Readings from Last 3 Encounters:   24 130/84   23 157/82   05/15/23 (!) 171/102       Physical Exam:  Physical Exam    Neurologic:  Alert & oriented x 3, no new focal deficits, Not in any acute distress,  Constitutional: Obese  Eyes:  Pupil equal and reacting to light, conjunctiva normal,   HENT:  Atraumatic, oropharynx moist, Neck- normal range of motion, no tenderness,  Neck supple, No JVP, No LNP   Respiratory:  Bilateral air entry, mostly clear to auscultation  Cardiovascular: S1-S2 regular with a I/VI systolic murmur   GI:  Soft, nondistended, normal bowel sounds, nontender, no hepatosplenomegaly appreciated.  Musculoskeletal:  No tenderness, no deformities.   Skin:  Well hydrated, no rash   Lymphatic:  No lymphadenopathy noted   Extremities:  No edema and distal pulses are present        Diagnostic Studies Review Cardio:      EKG: Sinus rhythm, heart rate 95/min, low voltage QRS    Cardiac testing:       Results for orders placed during the hospital encounter of 17    NM myocardial perfusion spect (rx stress and/or rest)    [address] [address] [phone]    Rest/Stress Gated SPECT Myocardial Perfusion Imaging After Regadenoson    Patient: APARNA MCINTYRE  MR number: [id number]  Account number: [id number]  : 1965  Age: 51 years  Gender: Female  Status: Outpatient  Location: Stress lab  Height: 61 in  Weight: 165 lb  BP: 116/ 81 mmHg    Allergies: SULFAMETHOXAZOLE-TRIMETHOPRIM, CODEINE    Diagnosis: R00.2 - Palpitations, R06.02 - Shortness of breath, R07.9 - Chest pain, unspecified    Primary Physician:  Linda Cartwright MD  RN:  NARA Waldron  [facility]  Report Prepared By::  NARA Waldron  Interpreting " Physician:  Alissa Billingsley MD    INDICATIONS: Evaluation of known coronary artery disease.    HISTORY: The patient is a 51 year old  female. Chest pain status: chest pain. Other symptoms: dyspnea and palpitations. Coronary artery disease risk factors: family history of premature coronary artery disease and diabetes  mellitus. Cardiovascular history: coronary artery disease. Prior cardiovascular procedures: coronary angiogram (on 2015). Co-morbidity: history of lung disease- COPD and obesity. Medications: aspirin.    PHYSICAL EXAM: Baseline physical exam screening: no wheezes audible.    REST ECG: Normal sinus rhythm.    PROCEDURE: The study was performed in the stress lab. A regadenoson infusion pharmacologic stress test was performed. Gated SPECT myocardial perfusion imaging was performed after stress and at rest. Systolic blood pressure was 116 mmHg, at  the start of the study. Diastolic blood pressure was 81 mmHg, at the start of the study. The heart rate was 73 bpm, at the start of the study. IV double checked.  Regadenoson protocol:  Time HR bpm SBP mmHg DBP mmHg Symptoms Rhythm/conduct  Baseline 11:22 73 116 81 none NSR  Immediate 11:26 113 124 66 chest discomfort sinus tach  1 min 11:27 101 124 72 same as above --  2 min 11:28 95 126 75 subsiding --  3 min 11:29 90 125 78 none --  patient refused treadmill exercise. No medications or fluids given.    STRESS SUMMARY: Duration of pharmacologic stress was 3 min. Maximal heart rate during stress was 113 bpm. The heart rate response to stress was normal. There was normal resting blood pressure with an appropriate response to stress. The  rate-pressure product for the peak heart rate and blood pressure was 08810. The patient experienced chest pain during stress; pain resolved spontaneously. The stress test was terminated due to protocol completion. Pre oxygen saturation: 97  %. Peak oxygen saturation: 97 %. The stress ECG was negative for ischemia and  normal. There were no stress arrhythmias or conduction abnormalities.    ISOTOPE ADMINISTRATION:  The radiopharmaceutical was injected at the peak effect of pharmacologic stress.    MYOCARDIAL PERFUSION IMAGING:  The image quality was good. Left ventricular size was normal.    PERFUSION DEFECTS:  -  There were no perfusion defects.    GATED SPECT:  The calculated left ventricular ejection fraction was 70 %. Left ventricular ejection fraction was within normal limits by visual estimate.    SUMMARY:  -  Stress results: The patient experienced chest pain during stress; pain resolved spontaneously.  -  ECG conclusions: The stress ECG was negative for ischemia and normal.  -  Perfusion imaging: There were no perfusion defects.  -  Gated SPECT: The calculated left ventricular ejection fraction was 70 %. Left ventricular ejection fraction was within normal limits by visual estimate.    IMPRESSIONS: Normal study after vasodilation and low level exercise. There were no significant perfusion abnormalities. Left ventricular systolic function was normal.    Prepared and signed by    Alissa Billingsley MD  Signed 01/17/2017 15:19:35        Imaging:  Chest X-Ray:   No Chest XR results available for this patient.    CT-scan of the chest:     CTA dissection protocol chest/abdomen/pelvis    Result Date: 11/9/2023  Impression No acute findings in the chest, abdomen or pelvis. No acute aortic findings. No significant atherosclerotic changes. Patent left subclavian and left axillary arteries in this patient with left arm tingling. Workstation performed: [id number]     Lab Review   Lab Results   Component Value Date    WBC 11.32 (H) 11/09/2023    HGB 13.8 11/09/2023    HCT 40.9 11/09/2023    MCV 90 11/09/2023    RDW 14.6 11/09/2023     11/09/2023     BMP:  Lab Results   Component Value Date    SODIUM 139 11/09/2023    K 3.7 11/09/2023     11/09/2023    CO2 23 11/09/2023    BUN 13 11/09/2023    CREATININE 0.66 11/09/2023     "GLUC 106 11/09/2023    CALCIUM 9.3 11/09/2023    CORRECTEDCA 9.4 10/25/2022    EGFR 96 11/09/2023    MG 1.9 11/09/2023     LFT:  Lab Results   Component Value Date    AST 17 11/09/2023    ALT 27 11/09/2023    ALKPHOS 93 11/09/2023    TP 7.7 11/09/2023    ALB 4.3 11/09/2023      No components found for: \"TSH3\"  No results found for: \"[id number]\"  Lab Results   Component Value Date    HGBA1C 5.7 01/17/2017     Lipid Profile:   Lab Results   Component Value Date    CHOLESTEROL 166 01/16/2017    HDL 64 (H) 01/16/2017    LDLCALC 89 01/16/2017    TRIG 67 01/16/2017     Lab Results   Component Value Date    CHOLESTEROL 166 01/16/2017     Lab Results   Component Value Date    TROPONINI <0.02 01/17/2017     Lab Results   Component Value Date    NTBNP 57 07/27/2022      No results found for this or any previous visit (from the past 672 hour(s)).          Dr. Joey Gould MD, FACC      \"This note has been constructed using a voice recognition system.Therefore there may be syntax, spelling, and/or grammatical errors. Please call if you have any questions. \"  "

## 2024-07-18 ENCOUNTER — OFFICE VISIT (OUTPATIENT)
Dept: CARDIOLOGY CLINIC | Facility: CLINIC | Age: 59
End: 2024-07-18

## 2024-07-18 VITALS
HEIGHT: 63 IN | OXYGEN SATURATION: 97 % | HEART RATE: 95 BPM | WEIGHT: 197 LBS | BODY MASS INDEX: 34.91 KG/M2 | DIASTOLIC BLOOD PRESSURE: 84 MMHG | SYSTOLIC BLOOD PRESSURE: 130 MMHG

## 2024-07-18 DIAGNOSIS — E78.5 DYSLIPIDEMIA: ICD-10-CM

## 2024-07-18 DIAGNOSIS — R07.9 CHEST PAIN, UNSPECIFIED TYPE: Primary | ICD-10-CM

## 2024-07-18 DIAGNOSIS — I10 ESSENTIAL HYPERTENSION: ICD-10-CM

## 2024-07-18 PROCEDURE — 93000 ELECTROCARDIOGRAM COMPLETE: CPT | Performed by: INTERNAL MEDICINE

## 2024-07-18 PROCEDURE — 99214 OFFICE O/P EST MOD 30 MIN: CPT | Performed by: INTERNAL MEDICINE

## 2024-07-18 RX ORDER — METOPROLOL SUCCINATE 25 MG/1
25 TABLET, EXTENDED RELEASE ORAL DAILY
Qty: 90 TABLET | Refills: 2 | Status: SHIPPED | OUTPATIENT
Start: 2024-07-18

## 2024-08-08 ENCOUNTER — APPOINTMENT (OUTPATIENT)
Dept: LAB | Facility: HOSPITAL | Age: 59
End: 2024-08-08
Payer: COMMERCIAL

## 2024-08-08 ENCOUNTER — TELEPHONE (OUTPATIENT)
Dept: CARDIOLOGY CLINIC | Facility: CLINIC | Age: 59
End: 2024-08-08

## 2024-08-08 ENCOUNTER — HOSPITAL ENCOUNTER (OUTPATIENT)
Dept: NON INVASIVE DIAGNOSTICS | Facility: HOSPITAL | Age: 59
Discharge: HOME | End: 2024-08-08
Attending: INTERNAL MEDICINE

## 2024-08-08 VITALS
BODY MASS INDEX: 34.91 KG/M2 | DIASTOLIC BLOOD PRESSURE: 87 MMHG | WEIGHT: 197 LBS | HEIGHT: 63 IN | SYSTOLIC BLOOD PRESSURE: 134 MMHG

## 2024-08-08 DIAGNOSIS — R07.9 CHEST PAIN, UNSPECIFIED TYPE: ICD-10-CM

## 2024-08-08 DIAGNOSIS — E78.5 DYSLIPIDEMIA: ICD-10-CM

## 2024-08-08 DIAGNOSIS — E78.2 MIXED HYPERLIPIDEMIA: Primary | ICD-10-CM

## 2024-08-08 LAB
ALBUMIN SERPL BCG-MCNC: 3.6 G/DL (ref 3.5–5)
ALP SERPL-CCNC: 83 U/L (ref 34–104)
ALT SERPL W P-5'-P-CCNC: 29 U/L (ref 7–52)
AORTIC ROOT: 2.8 CM
AST SERPL W P-5'-P-CCNC: 21 U/L (ref 13–39)
AV LVOT PEAK GRADIENT: 4 MMHG
AV PEAK GRADIENT: 7 MMHG
BILIRUB DIRECT SERPL-MCNC: 0.05 MG/DL (ref 0–0.2)
BILIRUB SERPL-MCNC: 0.33 MG/DL (ref 0.2–1)
BSA FOR ECHO PROCEDURE: 1.91 M2
CHOLEST SERPL-MCNC: 201 MG/DL
DOP CALC LVOT AREA: 3.14 CM2
DOP CALC LVOT DIAMETER: 2 CM
E WAVE DECELERATION TIME: 181 MS
E/A RATIO: 1.05
FRACTIONAL SHORTENING: 33 (ref 28–44)
GLOBAL LONGITUIDAL STRAIN: -22 %
HDLC SERPL-MCNC: 44 MG/DL
INTERVENTRICULAR SEPTUM IN DIASTOLE (PARASTERNAL SHORT AXIS VIEW): 0.9 CM
INTERVENTRICULAR SEPTUM: 0.9 CM (ref 0.6–1.1)
LAAS-AP2: 15 CM2
LAAS-AP4: 14.5 CM2
LDLC SERPL CALC-MCNC: 122 MG/DL (ref 0–100)
LEFT ATRIUM AREA SYSTOLE SINGLE PLANE A4C: 14.7 CM2
LEFT ATRIUM SIZE: 3.5 CM
LEFT ATRIUM VOLUME (MOD BIPLANE): 41 ML
LEFT ATRIUM VOLUME INDEX (MOD BIPLANE): 21.5 ML/M2
LEFT INTERNAL DIMENSION IN SYSTOLE: 2.8 CM (ref 2.1–4)
LEFT VENTRICULAR INTERNAL DIMENSION IN DIASTOLE: 4.2 CM (ref 3.5–6)
LEFT VENTRICULAR POSTERIOR WALL IN END DIASTOLE: 1.1 CM
LEFT VENTRICULAR STROKE VOLUME: 47 ML
LVSV (TEICH): 47 ML
MV E'TISSUE VEL-LAT: 9 CM/S
MV E'TISSUE VEL-SEP: 9 CM/S
MV PEAK A VEL: 0.98 M/S
MV PEAK E VEL: 103 CM/S
MV STENOSIS PRESSURE HALF TIME: 52 MS
MV VALVE AREA P 1/2 METHOD: 4.23
PROT SERPL-MCNC: 7.1 G/DL (ref 6.4–8.4)
PV PEAK GRADIENT: 4 MMHG
RIGHT ATRIUM AREA SYSTOLE A4C: 9.4 CM2
RIGHT VENTRICLE ID DIMENSION: 2.7 CM
SL CV LEFT ATRIUM LENGTH A2C: 4.3 CM
SL CV LV EF: 61
SL CV PED ECHO LEFT VENTRICLE DIASTOLIC VOLUME (MOD BIPLANE) 2D: 78 ML
SL CV PED ECHO LEFT VENTRICLE SYSTOLIC VOLUME (MOD BIPLANE) 2D: 31 ML
TR MAX PG: 12 MMHG
TR PEAK VELOCITY: 1.8 M/S
TRICUSPID ANNULAR PLANE SYSTOLIC EXCURSION: 2 CM
TRICUSPID VALVE PEAK REGURGITATION VELOCITY: 1.77 M/S
TRIGL SERPL-MCNC: 174 MG/DL

## 2024-08-08 PROCEDURE — 36415 COLL VENOUS BLD VENIPUNCTURE: CPT

## 2024-08-08 PROCEDURE — 80076 HEPATIC FUNCTION PANEL: CPT

## 2024-08-08 PROCEDURE — 93306 TTE W/DOPPLER COMPLETE: CPT

## 2024-08-08 PROCEDURE — 80061 LIPID PANEL: CPT

## 2024-08-08 PROCEDURE — 93306 TTE W/DOPPLER COMPLETE: CPT | Performed by: INTERNAL MEDICINE

## 2024-08-08 RX ORDER — ATORVASTATIN CALCIUM 20 MG/1
20 TABLET, FILM COATED ORAL DAILY
Qty: 90 TABLET | Refills: 2 | Status: SHIPPED | OUTPATIENT
Start: 2024-08-08

## 2024-08-08 RX ORDER — CHLORAL HYDRATE 500 MG
2000 CAPSULE ORAL DAILY
Qty: 180 CAPSULE | Refills: 2 | Status: SHIPPED | OUTPATIENT
Start: 2024-08-08

## 2024-08-08 NOTE — TELEPHONE ENCOUNTER
Pt notified of [facility] for [facility]

## 2024-08-08 NOTE — TELEPHONE ENCOUNTER
Pt states she stopped taking statin years ago and doesn't have it. If you can please send to pharmacy on file. Thank you

## 2024-08-08 NOTE — TELEPHONE ENCOUNTER
----- Message from Joey Gould MD sent at 8/8/2024 10:39 AM EDT -----  Please call and inform patient that the blood test showed that both LDL and triglycerides are high and liver enzymes are normal  Continue atorvastatin  Over-the-counter fish oil 2 g daily  Low-cholesterol diet, regular exercise and weight loss

## 2024-08-08 NOTE — TELEPHONE ENCOUNTER
----- Message from Joey Gould MD sent at 8/8/2024 12:53 PM EDT -----  Please call and inform the patient that the Echocardiogram showed normal pumping function of the heart.    No significant valve abnormality was seen.

## 2024-08-15 DIAGNOSIS — I10 ESSENTIAL HYPERTENSION: ICD-10-CM

## 2024-08-15 RX ORDER — METOPROLOL SUCCINATE 25 MG/1
25 TABLET, EXTENDED RELEASE ORAL DAILY
Qty: 90 TABLET | Refills: 0 | OUTPATIENT
Start: 2024-08-15

## 2024-08-15 NOTE — TELEPHONE ENCOUNTER
Reason for call:   [x] Refill   [] Prior Auth  [] Other:     Office:   [] PCP/Provider -   [x] Specialty/Provider - Cardio          Does the patient have enough for 3 days?   [] Yes   [x] No - Send as HP to POD

## 2024-08-30 NOTE — TELEPHONE ENCOUNTER
Patient called for update- informed patient pharmacy confirmed 7/18 90 tabs with 2 refills.she will contact [facility].

## 2025-05-07 DIAGNOSIS — I10 ESSENTIAL HYPERTENSION: ICD-10-CM

## 2025-05-07 DIAGNOSIS — E78.2 MIXED HYPERLIPIDEMIA: ICD-10-CM

## 2025-05-07 RX ORDER — METOPROLOL SUCCINATE 25 MG/1
25 TABLET, EXTENDED RELEASE ORAL DAILY
Qty: 90 TABLET | Refills: 0 | Status: SHIPPED | OUTPATIENT
Start: 2025-05-07

## 2025-05-07 RX ORDER — METOPROLOL SUCCINATE 25 MG/1
25 TABLET, EXTENDED RELEASE ORAL DAILY
Qty: 90 TABLET | Refills: 0 | OUTPATIENT
Start: 2025-05-07

## 2025-05-07 RX ORDER — ATORVASTATIN CALCIUM 20 MG/1
20 TABLET, FILM COATED ORAL DAILY
Qty: 90 TABLET | Refills: 1 | Status: SHIPPED | OUTPATIENT
Start: 2025-05-07

## 2025-05-07 NOTE — TELEPHONE ENCOUNTER
Pt contacted Call Center requested refill of their medication.        Doctor Name: Dr. Gould      Medication Name: metoprolol succinate       Dosage of Med: 25mg      Frequency of Med: Take 1 tablet by mouth once daily       Remaining Medication: 6      Pharmacy and Location: [place] [address] [[id number]]         Pt. Preferred Callback Phone Number: [phone]      Thank you.       PLEASE ADVISE PATIENTS:    REFILL REQUESTS WILL BE PROCESSED WITHIN 24-48 HOURS.      Pt contacted Call Center requested refill of their medication.        Doctor Name: Dr. Gould      Medication Name: atorvastatin       Dosage of Med: 20mg      Frequency of Med: Take 1 tablet (20 mg total) by mouth daily,       Remaining Medication: 10      Pharmacy and Location: [place] [address] [address] [[id number]]         Pt. Preferred Callback Phone Number: [phone]      Thank you.       PLEASE ADVISE PATIENTS:    REFILL REQUESTS WILL BE PROCESSED WITHIN 24-48 HOURS.